# Patient Record
Sex: FEMALE | Race: WHITE | Employment: FULL TIME | URBAN - NONMETROPOLITAN AREA
[De-identification: names, ages, dates, MRNs, and addresses within clinical notes are randomized per-mention and may not be internally consistent; named-entity substitution may affect disease eponyms.]

---

## 2017-01-13 DIAGNOSIS — G43.009 MIGRAINE WITHOUT AURA AND WITHOUT STATUS MIGRAINOSUS, NOT INTRACTABLE: ICD-10-CM

## 2017-01-13 RX ORDER — LAMOTRIGINE 100 MG/1
100 TABLET ORAL 2 TIMES DAILY
Qty: 60 TABLET | Refills: 5 | Status: SHIPPED | OUTPATIENT
Start: 2017-01-13 | End: 2017-02-20 | Stop reason: SDUPTHER

## 2017-02-20 ENCOUNTER — OFFICE VISIT (OUTPATIENT)
Dept: PHYSICAL MEDICINE AND REHAB | Age: 42
End: 2017-02-20

## 2017-02-20 VITALS
DIASTOLIC BLOOD PRESSURE: 80 MMHG | SYSTOLIC BLOOD PRESSURE: 111 MMHG | HEART RATE: 74 BPM | HEIGHT: 60 IN | BODY MASS INDEX: 24.26 KG/M2 | WEIGHT: 123.6 LBS

## 2017-02-20 DIAGNOSIS — G43.009 MIGRAINE WITHOUT AURA AND WITHOUT STATUS MIGRAINOSUS, NOT INTRACTABLE: Primary | ICD-10-CM

## 2017-02-20 PROCEDURE — 99213 OFFICE O/P EST LOW 20 MIN: CPT | Performed by: PSYCHIATRY & NEUROLOGY

## 2017-02-20 RX ORDER — LAMOTRIGINE 100 MG/1
100 TABLET ORAL 2 TIMES DAILY
Qty: 60 TABLET | Refills: 5 | Status: SHIPPED | OUTPATIENT
Start: 2017-02-20 | End: 2017-07-12 | Stop reason: SDUPTHER

## 2017-03-20 ENCOUNTER — TELEPHONE (OUTPATIENT)
Dept: PHYSICAL MEDICINE AND REHAB | Age: 42
End: 2017-03-20

## 2017-03-20 RX ORDER — ELETRIPTAN HYDROBROMIDE 40 MG/1
40 TABLET, FILM COATED ORAL DAILY PRN
Qty: 9 TABLET | Refills: 3 | Status: SHIPPED | OUTPATIENT
Start: 2017-03-20 | End: 2020-01-06 | Stop reason: SDUPTHER

## 2017-07-12 DIAGNOSIS — G43.009 MIGRAINE WITHOUT AURA AND WITHOUT STATUS MIGRAINOSUS, NOT INTRACTABLE: ICD-10-CM

## 2017-07-12 RX ORDER — LAMOTRIGINE 100 MG/1
100 TABLET ORAL 2 TIMES DAILY
Qty: 60 TABLET | Refills: 5 | Status: SHIPPED | OUTPATIENT
Start: 2017-07-12 | End: 2017-10-02 | Stop reason: SDUPTHER

## 2017-10-02 ENCOUNTER — OFFICE VISIT (OUTPATIENT)
Dept: NEUROLOGY | Age: 42
End: 2017-10-02
Payer: COMMERCIAL

## 2017-10-02 VITALS
SYSTOLIC BLOOD PRESSURE: 109 MMHG | WEIGHT: 132 LBS | HEART RATE: 62 BPM | DIASTOLIC BLOOD PRESSURE: 77 MMHG | HEIGHT: 60 IN | BODY MASS INDEX: 25.91 KG/M2

## 2017-10-02 DIAGNOSIS — G43.009 MIGRAINE WITHOUT AURA AND WITHOUT STATUS MIGRAINOSUS, NOT INTRACTABLE: ICD-10-CM

## 2017-10-02 DIAGNOSIS — G43.109 MIGRAINE WITH AURA AND WITHOUT STATUS MIGRAINOSUS, NOT INTRACTABLE: Primary | ICD-10-CM

## 2017-10-02 PROCEDURE — 99213 OFFICE O/P EST LOW 20 MIN: CPT | Performed by: PSYCHIATRY & NEUROLOGY

## 2017-10-02 RX ORDER — LAMOTRIGINE 150 MG/1
150 TABLET ORAL 2 TIMES DAILY
Qty: 60 TABLET | Refills: 5 | Status: SHIPPED | OUTPATIENT
Start: 2017-10-02 | End: 2018-03-05 | Stop reason: SDUPTHER

## 2017-10-02 NOTE — PATIENT INSTRUCTIONS
1. Change Lamictal to 150 mg twice a day. Refills given. 2. CBC and hepatic panel. 3. Provide us with feed back on progress in one month. 4. Follow up in 6 months.

## 2017-10-02 NOTE — PROGRESS NOTES
NEUROLOGY OUT PATIENT FOLLOW UP NOTE:  10/2/58026:53 PM    Kem Gutierrez is here for follow up headache. Her headaches are not well controlled. She can have headaches two times a week, migraines are 1-2 per month. She feels the Lamictal 100 mg twice a day helps. Her stress level is improved. She feels the lamictal helps with the stress level. Her stress level is better after changing jobs. She denies new symptoms. ROS:  Respiratory : no cough, no hemoptysis. Cardiac: no chest pain. No palpitations. Renal : no flank pain, no hematuria    Skin: no rash  Reviewed labs since last evaluation and discussed with patient. Allergies   Allergen Reactions    Sulfa Antibiotics Rash       Current Outpatient Prescriptions:     lamoTRIgine (LAMICTAL) 100 MG tablet, Take 1 tablet by mouth 2 times daily, Disp: 60 tablet, Rfl: 5    MULTIPLE VITAMINS PO, Take by mouth daily, Disp: , Rfl:     vitamin B-12 (CYANOCOBALAMIN) 1000 MCG tablet, Take 1,000 mcg by mouth daily, Disp: , Rfl:     NONFORMULARY, Immune Charge Vitamin(gummie), Disp: , Rfl:     Dapsone (ACZONE) 5 % GEL, Apply topically 2 times daily, Disp: , Rfl:     doxycycline (MORGIDOX) 100 MG capsule, Take 100 mg by mouth as needed, Disp: , Rfl:     butalbital-acetaminophen-caffeine (FIORICET, ESGIC) -40 MG per tablet, Take 1 tablet by mouth as needed for Pain, Disp: , Rfl:     Naproxen Sodium (ALEVE PO), Take by mouth as needed, Disp: , Rfl:     azelastine HCl 0.15 % SOLN, as needed , Disp: , Rfl:     eletriptan (RELPAX) 40 MG tablet, Take 1 tablet by mouth daily as needed (migraine) may repeat in 2 hours if necessary, Disp: 9 tablet, Rfl: 3      PE:   Vitals:    10/02/17 1538   BP: 109/77   Site: Left Arm   Position: Sitting   Pulse: 62   Weight: 132 lb (59.9 kg)   Height: 4' 11.84\" (1.52 m)     General Appearance:  alert and cooperative  Skin:  Skin color, texture, turgor normal. No rashes or lesions. Gen: AO3, NAD, Language is Intact.   Head: PERRL, EOMI, no icterus  Neck: There is no carotid bruits. The Neck is supple. Neuro: CN 2-12 grossly intact with no focal deficits. Power 5/5 Throughout symmetric, Reflexes are  symmetric. Long tracts are intact. Cerebellar exam is Intact. Sensory exam is intact to light touch. Gait is intact. Musculoskeletal:  Has no hand arthritis, no limitation of ROM in any of the four extremities. Lower extremities no edema        DATA:   EEG 11/24/2015:  IMPRESSION: This is a normal EEG. There was no evidence of epileptiform  activity appreciated throughout this recording. B 08=880  Folate=18      MRI brain 2/2017  I reviewed the MRI brain and agree with interpretation. Impression       1. Normal brain volume. 2. Minimal amount of abnormal signal which is faint in the subcortical white matter of the left frontal lobe. This can be related to patient history of chronic headaches. This could also represent early chronic small vessel ischemic changes. Assessment:    1. Migraine with aura and without status migrainosus, not intractable          Her headaches are not well controlled. She can have headaches two times a week, migraines are 1-2 per month. She feels the Lamictal 100 mg twice a day helps. Her stress level is improved. She is pleased with how she is doing. She denies new symptoms. Her exam is unchanged. After detailed discussion with patient we agreed on the following plan. Plan:  1. Change Lamictal to 150 mg twice a day. Refills given. 2. CBC and hepatic panel. 3. Provide us with feed back on progress in one month. 4. Follow up in 6 months. Please call if any questions.      Kyle Finney MD

## 2017-10-02 NOTE — MR AVS SNAPSHOT
your BMI, the greater your risk of heart disease, high blood pressure, type 2 diabetes, stroke, gallstones, arthritis, sleep apnea, and certain cancers. BMI is not perfect. It may overestimate body fat in athletes and people who are more muscular. If your body fat is high you can improve your BMI by decreasing your calorie intake and becoming more physically active. Learn more at: [email or web address]          Instructions    1. Change Lamictal to 150 mg twice a day. Refills given. 2. CBC and hepatic panel. 3. Provide us with feed back on progress in one month. 4. Follow up in 6 months. Today's Medication Changes          These changes are accurate as of: 10/2/17  3:59 PM.  If you have any questions, ask your nurse or doctor. CHANGE how you take these medications           lamoTRIgine 150 MG tablet   Commonly known as:  LAMICTAL   Instructions:   Take 1 tablet by mouth 2 times daily   Quantity:  60 tablet   Refills:  5   What changed:    - medication strength  - how much to take   Changed by:  Carol Reno MD            Where to Get Your Medications      These medications were sent to [address] [phone]  [address] [place] [id number]     Phone:  [phone]     lamoTRIgine 150 MG tablet               Your Current Medications Are              lamoTRIgine (LAMICTAL) 150 MG tablet Take 1 tablet by mouth 2 times daily    MULTIPLE VITAMINS PO Take by mouth daily    vitamin B-12 (CYANOCOBALAMIN) 1000 MCG tablet Take 1,000 mcg by mouth daily    NONFORMULARY Immune Charge Vitamin(gummie)    Dapsone (ACZONE) 5 % GEL Apply topically 2 times daily    doxycycline (MORGIDOX) 100 MG capsule Take 100 mg by mouth as needed    butalbital-acetaminophen-caffeine (FIORICET, ESGIC) -40 MG per tablet Take 1 tablet by mouth as needed for Pain Naproxen Sodium (ALEVE PO) Take by mouth as needed    azelastine HCl 0.15 % SOLN as needed     eletriptan (RELPAX) 40 MG tablet Take 1 tablet by mouth daily as needed (migraine) may repeat in 2 hours if necessary      Allergies              Sulfa Antibiotics Rash         Additional Information        Basic Information     Date Of Birth Sex Race Ethnicity Preferred Language    1975 Female White Non-/Non  English      Problem List as of 10/2/2017  Date Reviewed: 7/18/2016                Diverticulosis of large intestine without hemorrhage    Lower abdominal pain    GERD (gastroesophageal reflux disease)    Diarrhea      Preventive Care        Date Due    HIV screening is recommended for all people regardless of risk factors  aged 15-65 years at least once (lifetime) who have never been HIV tested. 10/31/1988    Tetanus Combination Vaccine (1 - Tdap) 10/31/1992    Pap Smear 10/31/1994    Cholesterol Screening 10/31/2013    Diabetes Screening 10/31/2013    Yearly Flu Vaccine (1) 9/1/2017            Certain Communicationst Signup           Our records indicate that you have an active [employer] account. You can view your After Visit Summary by going to [email or web address] ([email or web address]) and logging in with your [employer] username and password. If you don't have a [employer] username and password but a parent or guardian has access to your record, the parent or guardian should login with their own [employer] username and password and access your record to view the After Visit Summary. Additional Information  If you have questions, please contact the physician practice where you receive care. Remember, [employer] is NOT to be used for urgent needs. For medical emergencies, dial 911. For questions regarding your [employer] account call [phone]. If you have a clinical question, please call your doctor's office.

## 2018-03-05 ENCOUNTER — OFFICE VISIT (OUTPATIENT)
Dept: NEUROLOGY | Age: 43
End: 2018-03-05
Payer: COMMERCIAL

## 2018-03-05 VITALS
HEIGHT: 60 IN | HEART RATE: 62 BPM | BODY MASS INDEX: 25.13 KG/M2 | SYSTOLIC BLOOD PRESSURE: 112 MMHG | DIASTOLIC BLOOD PRESSURE: 80 MMHG | WEIGHT: 128 LBS

## 2018-03-05 DIAGNOSIS — G43.009 MIGRAINE WITHOUT AURA AND WITHOUT STATUS MIGRAINOSUS, NOT INTRACTABLE: ICD-10-CM

## 2018-03-05 DIAGNOSIS — G43.109 MIGRAINE WITH AURA AND WITHOUT STATUS MIGRAINOSUS, NOT INTRACTABLE: Primary | ICD-10-CM

## 2018-03-05 PROCEDURE — 99213 OFFICE O/P EST LOW 20 MIN: CPT | Performed by: PSYCHIATRY & NEUROLOGY

## 2018-03-05 RX ORDER — CHOLECALCIFEROL (VITAMIN D3) 25 MCG
CAPSULE ORAL PRN
COMMUNITY

## 2018-03-05 RX ORDER — VENLAFAXINE HYDROCHLORIDE 37.5 MG/1
37.5 CAPSULE, EXTENDED RELEASE ORAL DAILY
Qty: 30 CAPSULE | Refills: 3 | Status: SHIPPED | OUTPATIENT
Start: 2018-03-05 | End: 2018-03-26 | Stop reason: SINTOL

## 2018-03-05 RX ORDER — LAMOTRIGINE 150 MG/1
150 TABLET ORAL 2 TIMES DAILY
Qty: 60 TABLET | Refills: 5 | Status: SHIPPED | OUTPATIENT
Start: 2018-03-05 | End: 2018-08-16 | Stop reason: SDUPTHER

## 2018-03-26 ENCOUNTER — TELEPHONE (OUTPATIENT)
Dept: NEUROLOGY | Age: 43
End: 2018-03-26

## 2018-08-16 DIAGNOSIS — G43.009 MIGRAINE WITHOUT AURA AND WITHOUT STATUS MIGRAINOSUS, NOT INTRACTABLE: ICD-10-CM

## 2018-08-16 RX ORDER — LAMOTRIGINE 150 MG/1
150 TABLET ORAL 2 TIMES DAILY
Qty: 60 TABLET | Refills: 5 | Status: SHIPPED | OUTPATIENT
Start: 2018-08-16 | End: 2018-09-10 | Stop reason: SDUPTHER

## 2018-09-10 ENCOUNTER — OFFICE VISIT (OUTPATIENT)
Dept: NEUROLOGY | Age: 43
End: 2018-09-10
Payer: COMMERCIAL

## 2018-09-10 VITALS
HEIGHT: 60 IN | WEIGHT: 139 LBS | BODY MASS INDEX: 27.29 KG/M2 | DIASTOLIC BLOOD PRESSURE: 68 MMHG | SYSTOLIC BLOOD PRESSURE: 110 MMHG | HEART RATE: 78 BPM

## 2018-09-10 DIAGNOSIS — G43.109 MIGRAINE WITH AURA AND WITHOUT STATUS MIGRAINOSUS, NOT INTRACTABLE: Primary | ICD-10-CM

## 2018-09-10 DIAGNOSIS — G43.009 MIGRAINE WITHOUT AURA AND WITHOUT STATUS MIGRAINOSUS, NOT INTRACTABLE: ICD-10-CM

## 2018-09-10 PROCEDURE — 99213 OFFICE O/P EST LOW 20 MIN: CPT | Performed by: PSYCHIATRY & NEUROLOGY

## 2018-09-10 RX ORDER — LAMOTRIGINE 150 MG/1
150 TABLET ORAL 2 TIMES DAILY
Qty: 60 TABLET | Refills: 5 | Status: SHIPPED | OUTPATIENT
Start: 2018-09-10 | End: 2019-01-31 | Stop reason: SDUPTHER

## 2018-09-10 NOTE — PATIENT INSTRUCTIONS
1. Continue with Lamictal 150 mg twice a day. Refills given. 2. CBC and hepatic panel. 3. Provide us with feed back on progress in one month. 4. Follow up in 8 months.

## 2018-09-10 NOTE — PROGRESS NOTES
intact. Musculoskeletal:  Has no hand arthritis, no limitation of ROM in any of the four extremities. Lower extremities no edema        DATA:   EEG 11/24/2015:  IMPRESSION: This is a normal EEG. There was no evidence of epileptiform  activity appreciated throughout this recording. B 72=370  Folate=18      MRI brain 2/2017  I reviewed the MRI brain and agree with interpretation. Impression       1. Normal brain volume. 2. Minimal amount of abnormal signal which is faint in the subcortical white matter of the left frontal lobe. This can be related to patient history of chronic headaches. This could also represent early chronic small vessel ischemic changes. Assessment:     Diagnosis Orders   1. Migraine with aura and without status migrainosus, not intractable          Her headaches are better. Her stress level is good. Her sleep is good. She reports headaches one per day. She feels the Lamictal 150 mg twice a day helps. She feels there is room for improvement. Her exam is normal. After detailed discussion with patient we agreed on the following plan. Plan:  1. Continue with Lamictal 150 mg twice a day. Refills given. 2. CBC and hepatic panel. 3. Provide us with feed back on progress in one month. 4. Follow up in 8 months. Please call if any questions.      Liz Chaudhari MD

## 2019-01-31 DIAGNOSIS — G43.009 MIGRAINE WITHOUT AURA AND WITHOUT STATUS MIGRAINOSUS, NOT INTRACTABLE: ICD-10-CM

## 2019-01-31 RX ORDER — LAMOTRIGINE 150 MG/1
150 TABLET ORAL 2 TIMES DAILY
Qty: 60 TABLET | Refills: 5 | Status: SHIPPED | OUTPATIENT
Start: 2019-01-31 | End: 2019-08-15 | Stop reason: SDUPTHER

## 2019-05-09 PROBLEM — H91.90 HEARING LOSS: Status: ACTIVE | Noted: 2019-05-09

## 2019-05-09 PROBLEM — H69.80 DYSFUNCTION OF EUSTACHIAN TUBE: Status: ACTIVE | Noted: 2019-05-09

## 2019-05-09 PROBLEM — R10.13 EPIGASTRIC PAIN: Status: ACTIVE | Noted: 2019-05-09

## 2019-05-09 PROBLEM — H69.90 DYSFUNCTION OF EUSTACHIAN TUBE: Status: ACTIVE | Noted: 2019-05-09

## 2019-05-09 PROBLEM — R53.83 FATIGUE: Status: ACTIVE | Noted: 2019-05-09

## 2019-05-09 PROBLEM — R00.2 PALPITATIONS: Status: ACTIVE | Noted: 2019-05-09

## 2019-05-09 PROBLEM — R51.9 HEADACHE: Status: ACTIVE | Noted: 2019-05-09

## 2019-05-09 PROBLEM — N95.1 MENOPAUSAL SYNDROME: Status: ACTIVE | Noted: 2019-05-09

## 2019-05-09 PROBLEM — H92.09 REFERRED OTALGIA: Status: ACTIVE | Noted: 2019-05-09

## 2019-05-09 PROBLEM — J02.9 ACUTE PHARYNGITIS: Status: ACTIVE | Noted: 2019-05-09

## 2019-05-09 PROBLEM — L65.9 ALOPECIA: Status: ACTIVE | Noted: 2019-05-09

## 2019-05-09 PROBLEM — R53.83 MALAISE AND FATIGUE: Status: ACTIVE | Noted: 2019-05-09

## 2019-05-09 PROBLEM — R53.81 MALAISE AND FATIGUE: Status: ACTIVE | Noted: 2019-05-09

## 2019-05-09 PROBLEM — K58.9 IRRITABLE BOWEL SYNDROME: Status: ACTIVE | Noted: 2019-05-09

## 2019-05-09 PROBLEM — G43.109 MIGRAINE WITH AURA: Status: ACTIVE | Noted: 2019-05-09

## 2019-05-13 ENCOUNTER — OFFICE VISIT (OUTPATIENT)
Dept: NEUROLOGY | Age: 44
End: 2019-05-13
Payer: COMMERCIAL

## 2019-05-13 VITALS
BODY MASS INDEX: 27.29 KG/M2 | HEART RATE: 68 BPM | WEIGHT: 139 LBS | HEIGHT: 60 IN | SYSTOLIC BLOOD PRESSURE: 124 MMHG | DIASTOLIC BLOOD PRESSURE: 66 MMHG

## 2019-05-13 DIAGNOSIS — G43.109 MIGRAINE WITH AURA AND WITHOUT STATUS MIGRAINOSUS, NOT INTRACTABLE: Primary | ICD-10-CM

## 2019-05-13 PROCEDURE — 99213 OFFICE O/P EST LOW 20 MIN: CPT | Performed by: PSYCHIATRY & NEUROLOGY

## 2019-05-13 RX ORDER — DOXYCYCLINE HYCLATE 75 MG/1
75 TABLET, DELAYED RELEASE ORAL 2 TIMES DAILY PRN
COMMUNITY

## 2019-05-13 NOTE — LETTER
[address]  Phone: [phone]  Fax: [phone]    Gregg Noland MD        May 13, 2019     Patient: Felicitas Diaz   YOB: 1975   Date of Visit: 5/13/2019       To Whom it May Concern:    Marina Veronica was seen in my clinic on 5/13/2019. If you have any questions or concerns, please don't hesitate to call.     Sincerely,         Gregg Noland MD

## 2019-05-27 NOTE — PROGRESS NOTES
NEUROLOGY OUT PATIENT FOLLOW UP NOTE: 5/13/20193:05 PM  ?  Carmen Limon is here for follow up for headache. She feels her headaches are well controlled. She can still have occasional Headache that is relieved with over the counter medications. She is here to go over testing results and the plan of care going forward. ?  ?  ROS:  Respiratory : no cough, no shortness of breath  Cardiac: no chest pain. No palpitations. Renal : no flank pain, no hematuria   Skin: no rash  ? Allergies   Allergen Reactions    Sulfa Antibiotics Rash   ? Current Outpatient Medications:    Doxycycline Hyclate 75 MG TBEC, Take by mouth, Disp: , Rfl:    lamoTRIgine (LAMICTAL) 150 MG tablet, Take 1 tablet by mouth 2 times daily, Disp: 60 tablet, Rfl: 5   Cholecalciferol (VITAMIN D-3) 1000 units CAPS, Take by mouth, Disp: , Rfl:    NONFORMULARY, , Disp: , Rfl:    eletriptan (RELPAX) 40 MG tablet, Take 1 tablet by mouth daily as needed (migraine) may repeat in 2 hours if necessary, Disp: 9 tablet, Rfl: 3   MULTIPLE VITAMINS PO, Take by mouth daily, Disp: , Rfl:    Dapsone (ACZONE) 5 % GEL, Apply topically 2 times daily, Disp: , Rfl:    Naproxen Sodium (ALEVE PO), Take by mouth as needed, Disp: , Rfl:    azelastine HCl 0.15 % SOLN, as needed , Disp: , Rfl:   ?  PE:   Vitals:   ? 05/13/19 1450   BP: 124/66   Site: Left Upper Arm   Position: Sitting   Cuff Size: Medium Adult   Pulse: 68   Weight: 139 lb (63 kg)   Height: 5' (1.524 m)   General Appearance: awake, alert, oriented, in no acute distress  Gen: NAD, Language is Intact. Head: no rash, no icterus  Neck: There is no carotid bruits. The Neck is supple. Neuro: CN 2-12 grossly intact with no focal deficits. Power 5/5 Throughout symmetric, Reflexes are decreased symmetric. Long tracts are intact. Cerebellar exam is Intact. Sensory exam is intact to light touch. Gait is intact.   Musculoskeletal: Has no hand arthritis, no limitation of ROM in any of the four is here for follow up for headache. She feels her headaches are well controlled. She can still have occasional Headache that is relieved with over the counter medications. Her exam is non focal. Upon reviewing her blood work her LFTs were mildly elevated. We will arrange for her to repeat LFT. After detailed discussion with patient we agreed on the following plan. ?  ?  Plan:  1. Continue with Lamictal 150 mg twice a day. Refills given. 2. Repeat hepatic function panel. 3. Follow up in 8 months. ?  Please call if any questions.      Candice Wheat MD

## 2019-08-15 DIAGNOSIS — G43.009 MIGRAINE WITHOUT AURA AND WITHOUT STATUS MIGRAINOSUS, NOT INTRACTABLE: Primary | ICD-10-CM

## 2019-08-15 RX ORDER — LAMOTRIGINE 150 MG/1
150 TABLET ORAL 2 TIMES DAILY
Qty: 60 TABLET | Refills: 5 | Status: SHIPPED | OUTPATIENT
Start: 2019-08-15 | End: 2020-02-24 | Stop reason: SDUPTHER

## 2020-01-06 ENCOUNTER — OFFICE VISIT (OUTPATIENT)
Dept: NEUROLOGY | Age: 45
End: 2020-01-06
Payer: COMMERCIAL

## 2020-01-06 VITALS
HEIGHT: 60 IN | DIASTOLIC BLOOD PRESSURE: 62 MMHG | BODY MASS INDEX: 25.4 KG/M2 | WEIGHT: 129.4 LBS | SYSTOLIC BLOOD PRESSURE: 106 MMHG | HEART RATE: 80 BPM

## 2020-01-06 PROCEDURE — 99213 OFFICE O/P EST LOW 20 MIN: CPT | Performed by: PSYCHIATRY & NEUROLOGY

## 2020-01-06 RX ORDER — ELETRIPTAN HYDROBROMIDE 40 MG/1
40 TABLET, FILM COATED ORAL DAILY PRN
Qty: 9 TABLET | Refills: 3 | Status: SHIPPED | OUTPATIENT
Start: 2020-01-06 | End: 2020-04-22 | Stop reason: SDUPTHER

## 2020-01-06 RX ORDER — ACETAMINOPHEN 325 MG/1
650 TABLET ORAL EVERY 6 HOURS PRN
COMMUNITY

## 2020-01-06 NOTE — PROGRESS NOTES
NEUROLOGY OUT PATIENT FOLLOW UP NOTE: 5/13/20193:05 PM  ?  Sly Beard is here for follow up for headaches. The patient reports her headaches are worse in the last month, she has increased job stressors recently. The pattern of the headache location and intensity are the same. Her liver functions have normalized in May 2019. She does not use her rescue medications for breakthrough headaches. She is here to discuss plan of care going forward. ?  ?  ROS:  Respiratory : no cough, no shortness of breath  Cardiac: no chest pain. No palpitations. Renal : no flank pain, no hematuria   Skin: no rash  ? Allergies   Allergen Reactions    Sulfa Antibiotics Rash   ? Current Outpatient Medications on File Prior to Visit   Medication Sig Dispense Refill    acetaminophen (TYLENOL) 325 MG tablet Take 650 mg by mouth every 6 hours as needed for Pain      lamoTRIgine (LAMICTAL) 150 MG tablet Take 1 tablet by mouth 2 times daily 60 tablet 5    Doxycycline Hyclate 75 MG TBEC Take 75 mg by mouth 2 times daily       NONFORMULARY       Cholecalciferol (VITAMIN D-3) 1000 units CAPS Take by mouth      MULTIPLE VITAMINS PO Take by mouth daily      Dapsone (ACZONE) 5 % GEL Apply topically 2 times daily      Naproxen Sodium (ALEVE PO) Take by mouth as needed      azelastine HCl 0.15 % SOLN as needed        No current facility-administered medications on file prior to visit. ?  PE:   Vitals:    01/06/20 1459   BP: 106/62   Site: Left Upper Arm   Position: Sitting   Cuff Size: Small Adult   Pulse: 80   Weight: 129 lb 6.4 oz (58.7 kg)   Height: 5' (1.524 m)      General Appearance: awake, alert, oriented, in no acute distress  Gen: NAD, Language is Intact. Head: no rash, no icterus  Neck: There is no carotid bruits. The Neck is supple. Neuro: CN 2-12 grossly intact with no focal deficits. Power 5/5 Throughout symmetric, Reflexes are decreased symmetric. Long tracts are intact. Cerebellar exam is Intact.  Sensory AM     ?  Assessment:  ? Diagnosis Orders   1. Migraine with aura and without status migrainosus, not intractable  ? ? She is here for follow up for headache. Her headache was worse in the last one month, with increased job stress. Her pattern of headache is the same. He exam is normal. Her LFTs have normalized in 5/2019. She did not use rescue medication for her headache. After detailed discussion with patient we agreed on the following plan. ?  ?  Plan:  1. Continue with Lamictal 150 mg twice a day. Refills given. 2. May use Relpax for breakthrough headache. 3. CBC and hepatic panel  4. Follow up in 4 months. ?  Please call if any questions.      Roro Byers MD

## 2020-02-24 RX ORDER — LAMOTRIGINE 150 MG/1
150 TABLET ORAL 2 TIMES DAILY
Qty: 60 TABLET | Refills: 5 | Status: SHIPPED | OUTPATIENT
Start: 2020-02-24 | End: 2020-04-22 | Stop reason: SDUPTHER

## 2020-04-22 ENCOUNTER — VIRTUAL VISIT (OUTPATIENT)
Dept: NEUROLOGY | Age: 45
End: 2020-04-22
Payer: COMMERCIAL

## 2020-04-22 PROCEDURE — 99213 OFFICE O/P EST LOW 20 MIN: CPT | Performed by: PSYCHIATRY & NEUROLOGY

## 2020-04-22 RX ORDER — LAMOTRIGINE 150 MG/1
150 TABLET ORAL 2 TIMES DAILY
Qty: 60 TABLET | Refills: 5 | Status: SHIPPED | OUTPATIENT
Start: 2020-04-22 | End: 2020-10-17 | Stop reason: SDUPTHER

## 2020-04-22 RX ORDER — ELETRIPTAN HYDROBROMIDE 40 MG/1
40 TABLET, FILM COATED ORAL DAILY PRN
Qty: 9 TABLET | Refills: 3 | Status: SHIPPED | OUTPATIENT
Start: 2020-04-22 | End: 2021-12-13 | Stop reason: SDUPTHER

## 2020-04-22 NOTE — PROGRESS NOTES
2020    TELEHEALTH EVALUATION -- Audio/Visual (During [facility]-55 public health emergency)    HPI:    Shruti Garcia (:  1975) has requested an audio/video evaluation for the following concern(s):  Follow up for migraine headaches. She reports doing better with her since last evaluation. She is currently on Lamictal 150 mg twice a day tolerated well no side effects reported to the medication. She also uses Relpax for breakthrough headaches occasionally. Her stress level is better. She denies new symptoms, she is evaluated via video link to discuss plan of care going forward, and medication options. .     Review of Systems   Constitutional: Negative. Musculoskeletal: Negative. Skin: Negative. Hematological: Does not bruise/bleed easily. Prior to Visit Medications    Medication Sig Taking? Authorizing Provider   lamoTRIgine (LAMICTAL) 150 MG tablet Take 1 tablet by mouth 2 times daily  SONIA Watson - CNP   acetaminophen (TYLENOL) 325 MG tablet Take 650 mg by mouth every 6 hours as needed for Pain  Historical Provider, MD   eletriptan (RELPAX) 40 MG tablet Take 1 tablet by mouth daily as needed (migraine) may repeat in 2 hours if necessary  Kathryn Okeefe MD   Doxycycline Hyclate 75 MG TBEC Take 75 mg by mouth 2 times daily   Historical Provider, MD   Cholecalciferol (VITAMIN D-3) 1000 units CAPS Take by mouth  Historical Provider, MD   NONFORMULARY   Historical Provider, MD   MULTIPLE VITAMINS PO Take by mouth daily  Historical Provider, MD   Dapsone (ACZONE) 5 % GEL Apply topically 2 times daily  Historical Provider, MD   Naproxen Sodium (ALEVE PO) Take by mouth as needed  Historical Provider, MD   azelastine HCl 0.15 % SOLN as needed   Historical Provider, MD       Social History     Tobacco Use    Smoking status: Never Smoker    Smokeless tobacco: Never Used   Substance Use Topics    Alcohol use:  Yes     Alcohol/week: 1.0 standard drinks     Types: 1 Cans of beer per Pursuant to the emergency declaration under the [facility], [address] [place] [address] authority and the [employer] and [employer] Act, this Virtual Visit was conducted with patient's (and/or legal guardian's) consent, to reduce the patient's risk of exposure to COVID-19 and provide necessary medical care. The patient (and/or legal guardian) has also been advised to contact this office for worsening conditions or problems, and seek emergency medical treatment and/or call 911 if deemed necessary. Services were provided through a video synchronous discussion virtually to substitute for in-person clinic visit. Patient and provider were located at their individual homes. --Thalia Hernandez MD on 4/22/2020 at 1:56 PM    An electronic signature was used to authenticate this note.

## 2020-04-22 NOTE — PATIENT INSTRUCTIONS
1. Continue with Lamictal 150 mg twice a day. Refills given. 2. May use Relpax for breakthrough headache. 3. CBC and hepatic panel  4. Follow up in 4 months.

## 2020-07-30 ENCOUNTER — TELEPHONE (OUTPATIENT)
Dept: NEUROLOGY | Age: 45
End: 2020-07-30

## 2020-07-30 NOTE — TELEPHONE ENCOUNTER
Sedrate, MRI Brain without and with contrast (blurred vision and headache) please. Follow up next week.    Isaac Sullivan MD

## 2020-08-10 ENCOUNTER — TELEPHONE (OUTPATIENT)
Dept: NEUROLOGY | Age: 45
End: 2020-08-10

## 2020-08-10 ENCOUNTER — VIRTUAL VISIT (OUTPATIENT)
Dept: NEUROLOGY | Age: 45
End: 2020-08-10
Payer: COMMERCIAL

## 2020-08-10 PROCEDURE — 99213 OFFICE O/P EST LOW 20 MIN: CPT | Performed by: PSYCHIATRY & NEUROLOGY

## 2020-08-10 RX ORDER — ERENUMAB-AOOE 70 MG/ML
70 INJECTION SUBCUTANEOUS
Qty: 1 PEN | Refills: 3 | Status: SHIPPED | OUTPATIENT
Start: 2020-08-10 | End: 2020-12-14

## 2020-08-10 NOTE — PATIENT INSTRUCTIONS
1. Start Aimovig 70 mg monthly. 2. Continue with Lamictal 150 mg twice a day. Refills given. 3. May use Relpax for breakthrough headache. 4. Follow up in 3-4 months.

## 2020-08-10 NOTE — TELEPHONE ENCOUNTER
----- Message from Alessia Duke MD sent at 8/9/2020  9:44 AM EDT -----  Please let patient Know MRI brain result was normal.

## 2020-08-10 NOTE — PROGRESS NOTES
as needed  Historical Provider, MD   azelastine HCl 0.15 % SOLN as needed   Historical Provider, MD       Social History     Tobacco Use    Smoking status: Never Smoker    Smokeless tobacco: Never Used   Substance Use Topics    Alcohol use: Yes     Alcohol/week: 1.0 standard drinks     Types: 1 Cans of beer per week     Comment: not very often    Drug use: No        Past Medical History:   Diagnosis Date    Allergic rhinitis     Bladder infection     Blood in urine     Bronchitis     Frequent headaches     Frequent sinus infections     GERD (gastroesophageal reflux disease)     Headache     Irritable bowel syndrome     Migraines     Painful swelling of joint     Rosacea     Tattoos    ,   Past Surgical History:   Procedure Laterality Date    COLONOSCOPY  2016    ELBOW SURGERY Left 06/28/2016    FINGER TRIGGER RELEASE      HYSTERECTOMY      OVARY REMOVAL Right     SHOULDER SURGERY      SINUS SURGERY  4/2015    UPPER GASTROINTESTINAL ENDOSCOPY  2016   ,   Social History     Tobacco Use    Smoking status: Never Smoker    Smokeless tobacco: Never Used   Substance Use Topics    Alcohol use:  Yes     Alcohol/week: 1.0 standard drinks     Types: 1 Cans of beer per week     Comment: not very often    Drug use: No   ,   Family History   Problem Relation Age of Onset    Arthritis Mother     Other Mother         Brain Aneurysm    Cancer Mother     High Blood Pressure Mother     COPD Father     Cancer Maternal Grandmother     Cancer Maternal Grandfather     Cancer Paternal Grandmother     Cancer Paternal Grandfather        PHYSICAL EXAMINATION:  [ INSTRUCTIONS:  \"[x]\" Indicates a positive item  \"[]\" Indicates a negative item  -- DELETE ALL ITEMS NOT EXAMINED]  Vital Signs: (As obtained by patient/caregiver or practitioner observation)    Blood pressure-  Heart rate-    Respiratory rate-    Temperature-  Pulse oximetry-     Constitutional: [x] Appears well-developed and well-nourished [x] No apparent distress      [] Abnormal-   Mental status  [x] Alert and awake  [x] Oriented to person/place/time [x]Able to follow commands      Eyes:  EOM    [x]  Normal  [] Abnormal-  Sclera  [x]  Normal  [] Abnormal -         Discharge [x]  None visible  [] Abnormal -    HENT:   [x] Normocephalic, atraumatic. [] Abnormal   [x] Mouth/Throat: Mucous membranes are moist.     External Ears [x] Normal  [] Abnormal-     Neck: [x] No visualized mass     Pulmonary/Chest: [x] Respiratory effort normal.  [x] No visualized signs of difficulty breathing or respiratory distress        [] Abnormal-      Musculoskeletal:   [x] Normal gait with no signs of ataxia         [x] Normal range of motion of neck        [] Abnormal-       Neurological:        [x] No Facial Asymmetry (Cranial nerve 7 motor function) (limited exam to video visit)          [x] No gaze palsy        [] Abnormal-         Skin:        [x] No significant exanthematous lesions or discoloration noted on facial skin         [] Abnormal-            Psychiatric:       [x] Normal Affect [x] No Hallucinations        [] Abnormal-     Other pertinent observable physical exam findings-     ASSESSMENT/PLAN:  1. Migraine with aura and without status migrainosus, not intractable  She reports her migraines are not well controlled on the current combination of medications. She has tried multiple medicines as outlined above. She would benefit from Aimovig 70 mg subcutaneous monthly. As she has not responded well to other multiple medications as stated. She does not like the after math fatigue and sleepiness of Relpax. After detailed discussion with patient we agreed on the following plan. 1. Start Aimovig 70 mg monthly. 2. Continue with Lamictal 150 mg twice a day. Refills given. 3. May use Relpax for breakthrough headache. 4. Follow up in 3-4 months. Return in about 3 months (around 11/10/2020).   Time spent evaluating patient, reviewing records, counseling,was more than 15 min. All patient's questions were answered. Please call if any questions. Anthony Thakur is a 53 y.o. female being evaluated by a Virtual Visit (video visit) encounter to address concerns as mentioned above. A caregiver was present when appropriate. Due to this being a TeleHealth encounter (During [facility]-24 public health emergency), evaluation of the following organ systems was limited: Vitals/Constitutional/EENT/Resp/CV/GI//MS/Neuro/Skin/Heme-Lymph-Imm. Pursuant to the emergency declaration under the [address] [place] [address], [address] [place] [address] authority and the [employer] and [employer] Act, this Virtual Visit was conducted with patient's (and/or legal guardian's) consent, to reduce the patient's risk of exposure to COVID-19 and provide necessary medical care. The patient (and/or legal guardian) has also been advised to contact this office for worsening conditions or problems, and seek emergency medical treatment and/or call 911 if deemed necessary. Services were provided through a video synchronous discussion virtually to substitute for in-person clinic visit. Patient and provider were located at their individual homes. --Tyson Oates MD on 8/10/2020 at 10:21 AM    An electronic signature was used to authenticate this note.

## 2020-10-07 ENCOUNTER — TELEPHONE (OUTPATIENT)
Dept: NEUROLOGY | Age: 45
End: 2020-10-07

## 2020-10-07 NOTE — TELEPHONE ENCOUNTER
Patient sent [employer] message stating for the past 2 months she is having shaking in her arms that got worse with her first Aimovig injection. She is due for next Aimovig injection next week. She is wondering if she should proceed with the Aimovig. The Abby Caleb is helping with her headaches. Please advise. Thank you.

## 2020-10-19 RX ORDER — LAMOTRIGINE 150 MG/1
150 TABLET ORAL 2 TIMES DAILY
Qty: 60 TABLET | Refills: 5 | Status: SHIPPED | OUTPATIENT
Start: 2020-10-19 | End: 2020-12-14 | Stop reason: SDUPTHER

## 2020-12-14 ENCOUNTER — VIRTUAL VISIT (OUTPATIENT)
Dept: NEUROLOGY | Age: 45
End: 2020-12-14
Payer: COMMERCIAL

## 2020-12-14 PROCEDURE — 99213 OFFICE O/P EST LOW 20 MIN: CPT | Performed by: PSYCHIATRY & NEUROLOGY

## 2020-12-14 RX ORDER — LAMOTRIGINE 150 MG/1
150 TABLET ORAL 2 TIMES DAILY
Qty: 60 TABLET | Refills: 5 | Status: SHIPPED | OUTPATIENT
Start: 2020-12-14 | End: 2021-06-07 | Stop reason: SDUPTHER

## 2020-12-14 RX ORDER — ERENUMAB-AOOE 70 MG/ML
INJECTION SUBCUTANEOUS
Qty: 1 PEN | Refills: 3 | Status: SHIPPED | OUTPATIENT
Start: 2020-12-14 | End: 2021-04-14

## 2020-12-14 RX ORDER — ERENUMAB-AOOE 70 MG/ML
INJECTION SUBCUTANEOUS
Qty: 1 PEN | Refills: 3 | Status: SHIPPED | OUTPATIENT
Start: 2020-12-14 | End: 2020-12-14 | Stop reason: SDUPTHER

## 2020-12-14 NOTE — PATIENT INSTRUCTIONS
1. Continue with Aimovig 70 mg monthly. 2. Continue with Lamictal 150 mg twice a day. Refills given. 3. May use Relpax for breakthrough headache. 4. Follow up in 6 months.

## 2020-12-14 NOTE — PROGRESS NOTES
2020    TELEHEALTH EVALUATION -- Audio/Visual (During DAOTP-21 public health emergency)    HPI:    Louisa Hagan (:  1975) has requested an audio/video evaluation for the following concern(s):  Follow up for migraine headaches. The patient reports her headaches have improved compared to last evaluation, combination of Aimovig, and Lamictal are helping with the reduction of her headaches, occasionally she can have breakthrough headaches that she uses Relpax for. She denies any side effects to the medication. She has tried Effexor, Topamax, Depakote, Fioricet, and Frova in the past.She reports doing better with her since last evaluation. Due to the severity of her symptoms, the patient was referred for MRI brain, and sed rate that were normal.  She still reports headaches that are frequent and severe, and incapacitating. She is evaluated via video link to discuss plan of care going forward, and medication options. .     Review of Systems   Constitutional: Negative. Musculoskeletal: Negative. Skin: Negative. Hematological: Does not bruise/bleed easily. Prior to Visit Medications    Medication Sig Taking?  Authorizing Provider   AIMOVIG 70 MG/ML SOAJ INJECT 70 MILLIGRAMS (ONE INJECTOR) UNDER THE SKIN EVERY 30 DAYS  SONIA Ram CNP   lamoTRIgine (LAMICTAL) 150 MG tablet Take 1 tablet by mouth 2 times daily  SONIA Ram CNP   eletriptan (RELPAX) 40 MG tablet Take 1 tablet by mouth daily as needed (migraine) may repeat in 2 hours if necessary  Yoli Paredes MD   acetaminophen (TYLENOL) 325 MG tablet Take 650 mg by mouth every 6 hours as needed for Pain  Historical Provider, MD   Doxycycline Hyclate 75 MG TBEC Take 75 mg by mouth 2 times daily as needed   Historical Provider, MD   Cholecalciferol (VITAMIN D-3) 1000 units CAPS Take by mouth  Historical Provider, MD   NONFORMULARY   Historical Provider, MD   MULTIPLE VITAMINS PO Take by mouth daily  Historical Provider, MD   Dapsone (ACZONE) 5 % GEL Apply topically 2 times daily  Historical Provider, MD   Naproxen Sodium (ALEVE PO) Take by mouth as needed  Historical Provider, MD   azelastine HCl 0.15 % SOLN as needed   Historical Provider, MD       Social History     Tobacco Use    Smoking status: Never Smoker    Smokeless tobacco: Never Used   Substance Use Topics    Alcohol use: Yes     Alcohol/week: 1.0 standard drinks     Types: 1 Cans of beer per week     Comment: not very often    Drug use: No        Past Medical History:   Diagnosis Date    Allergic rhinitis     Bladder infection     Blood in urine     Bronchitis     Frequent headaches     Frequent sinus infections     GERD (gastroesophageal reflux disease)     Headache     Irritable bowel syndrome     Migraines     Painful swelling of joint     Rosacea     Tattoos    ,   Past Surgical History:   Procedure Laterality Date    COLONOSCOPY  2016    ELBOW SURGERY Left 06/28/2016    FINGER TRIGGER RELEASE      HYSTERECTOMY      OVARY REMOVAL Right     SHOULDER SURGERY      SINUS SURGERY  4/2015    UPPER GASTROINTESTINAL ENDOSCOPY  2016   ,   Social History     Tobacco Use    Smoking status: Never Smoker    Smokeless tobacco: Never Used   Substance Use Topics    Alcohol use:  Yes     Alcohol/week: 1.0 standard drinks     Types: 1 Cans of beer per week     Comment: not very often    Drug use: No   ,   Family History   Problem Relation Age of Onset    Arthritis Mother     Other Mother         Brain Aneurysm    Cancer Mother     High Blood Pressure Mother     COPD Father     Cancer Maternal Grandmother     Cancer Maternal Grandfather     Cancer Paternal Grandmother     Cancer Paternal Grandfather        PHYSICAL EXAMINATION:  [ INSTRUCTIONS:  \"[x]\" Indicates a positive item  \"[]\" Indicates a negative item  -- DELETE ALL ITEMS NOT EXAMINED]  Vital Signs: (As obtained by patient/caregiver or practitioner observation)    Blood pressure- Heart rate-    Respiratory rate-    Temperature-  Pulse oximetry-     Constitutional: [x] Appears well-developed and well-nourished [x] No apparent distress      [] Abnormal-   Mental status  [x] Alert and awake  [x] Oriented to person/place/time [x]Able to follow commands      Eyes:  EOM    [x]  Normal  [] Abnormal-  Sclera  [x]  Normal  [] Abnormal -         Discharge [x]  None visible  [] Abnormal -    HENT:   [x] Normocephalic, atraumatic. [] Abnormal   [x] Mouth/Throat: Mucous membranes are moist.     External Ears [x] Normal  [] Abnormal-     Neck: [x] No visualized mass     Pulmonary/Chest: [x] Respiratory effort normal.  [x] No visualized signs of difficulty breathing or respiratory distress        [] Abnormal-      Musculoskeletal:   [x] Normal gait with no signs of ataxia         [x] Normal range of motion of neck        [] Abnormal-       Neurological:        [x] No Facial Asymmetry (Cranial nerve 7 motor function) (limited exam to video visit)          [x] No gaze palsy        [] Abnormal-         Skin:        [x] No significant exanthematous lesions or discoloration noted on facial skin         [] Abnormal-            Psychiatric:       [x] Normal Affect [x] No Hallucinations        [] Abnormal-     Other pertinent observable physical exam findings-   Results for orders placed or performed during the hospital encounter of 03/18/17   Sedimentation Rate   Result Value Ref Range    Sed Rate 10 0 - 20 mm/hr      ASSESSMENT/PLAN:  1. Migraine with aura and without status migrainosus, not intractable  She reports her migraines are well controlled with current combination of medications. Her headaches are few and far apart. She uses Lamictal, Aimovig, for prevention, and Relpax for breakthrough. After detailed discussion with patient we agreed on the following plan. 1. Continue with Aimovig 70 mg monthly. 2. Continue with Lamictal 150 mg twice a day. Refills given.   3. May use Relpax for breakthrough

## 2021-01-13 NOTE — TELEPHONE ENCOUNTER
If she feels this shakiness is started after the Aimovig, then she needs to avoid it, as we cannot reverse its effect after taking the second injection for a whole month. If she wants to try it and see if she has it again then that is also an option realizing that it may cause the same effect if it is indeed the medication.    Maureen Benton MD Albendazole Counseling:  I discussed with the patient the risks of albendazole including but not limited to cytopenia, kidney damage, nausea/vomiting and severe allergy.  The patient understands that this medication is being used in an off-label manner.

## 2021-04-14 DIAGNOSIS — G43.109 MIGRAINE WITH AURA AND WITHOUT STATUS MIGRAINOSUS, NOT INTRACTABLE: Primary | ICD-10-CM

## 2021-04-14 RX ORDER — ERENUMAB-AOOE 70 MG/ML
INJECTION SUBCUTANEOUS
Qty: 1 PEN | Refills: 5 | Status: SHIPPED | OUTPATIENT
Start: 2021-04-14 | End: 2021-06-07 | Stop reason: SDUPTHER

## 2021-06-07 ENCOUNTER — OFFICE VISIT (OUTPATIENT)
Dept: NEUROLOGY | Age: 46
End: 2021-06-07
Payer: COMMERCIAL

## 2021-06-07 VITALS
HEIGHT: 60 IN | DIASTOLIC BLOOD PRESSURE: 64 MMHG | BODY MASS INDEX: 26.11 KG/M2 | SYSTOLIC BLOOD PRESSURE: 118 MMHG | HEART RATE: 75 BPM | OXYGEN SATURATION: 97 % | WEIGHT: 133 LBS

## 2021-06-07 DIAGNOSIS — G43.109 MIGRAINE WITH AURA AND WITHOUT STATUS MIGRAINOSUS, NOT INTRACTABLE: Primary | ICD-10-CM

## 2021-06-07 PROCEDURE — 99213 OFFICE O/P EST LOW 20 MIN: CPT | Performed by: PSYCHIATRY & NEUROLOGY

## 2021-06-07 RX ORDER — ERENUMAB-AOOE 70 MG/ML
INJECTION SUBCUTANEOUS
Qty: 1 PEN | Refills: 5 | Status: SHIPPED | OUTPATIENT
Start: 2021-06-07 | End: 2021-12-06

## 2021-06-07 RX ORDER — OMEPRAZOLE 20 MG/1
20 CAPSULE, DELAYED RELEASE ORAL PRN
COMMUNITY
Start: 2020-05-20

## 2021-06-07 RX ORDER — ALBUTEROL SULFATE 90 UG/1
AEROSOL, METERED RESPIRATORY (INHALATION) PRN
COMMUNITY
Start: 2020-05-20

## 2021-06-07 RX ORDER — LAMOTRIGINE 150 MG/1
150 TABLET ORAL 2 TIMES DAILY
Qty: 60 TABLET | Refills: 5 | Status: SHIPPED | OUTPATIENT
Start: 2021-06-07 | End: 2021-12-13 | Stop reason: SDUPTHER

## 2021-06-07 NOTE — PROGRESS NOTES
NEUROLOGY OUT PATIENT FOLLOW UP NOTE:  6/7/20211:20 PM    Miguel Broussard is here for follow up for   Patient Active Problem List   Diagnosis    Lower abdominal pain    GERD (gastroesophageal reflux disease)    Diarrhea    Diverticulosis of large intestine without hemorrhage    Acute bronchitis    Acute frontal sinusitis    Acute pharyngitis    Acute serous otitis media    Allergic rhinitis    Allergic rhinitis due to pollen    Alopecia    Dysfunction of eustachian tube    Fatigue    Gastro-esophageal reflux disease with esophagitis    Headache    Hearing loss    Hematuria    Irritable bowel syndrome    Malaise and fatigue    Menopausal syndrome    Migraine with aura    Palpitations    Referred otalgia    Epigastric pain    Urinary tract infectious disease    Wrist joint pain       Follow up for migraine headaches. The patient reports her headaches have improved compared to last evaluation, combination of Aimovig, and Lamictal are helping with the reduction of her headaches, occasionally she can have breakthrough headaches that she uses Relpax for. She denies any side effects to the medication. She is here to go over plan. ROS:  Respiratory : no cough, no shortness of breath  Cardiac: no chest pain. No palpitations.   Renal : no flank pain, no hematuria    Skin: no rash      Allergies   Allergen Reactions    Sulfa Antibiotics Rash       Current Outpatient Medications:     albuterol sulfate  (90 Base) MCG/ACT inhaler, as needed, Disp: , Rfl:     omeprazole (PRILOSEC) 20 MG delayed release capsule, 20 mg as needed, Disp: , Rfl:     Erenumab-aooe (AIMOVIG) 70 MG/ML SOAJ, INJECT 70 MILLIGRAMS UNDER THE SKIN EVERY 30 DAYS, Disp: 1 pen, Rfl: 5    lamoTRIgine (LAMICTAL) 150 MG tablet, Take 1 tablet by mouth 2 times daily, Disp: 60 tablet, Rfl: 5    eletriptan (RELPAX) 40 MG tablet, Take 1 tablet by mouth daily as needed (migraine) may repeat in 2 hours if necessary, Disp: 9 tablet, Rfl: 3    acetaminophen (TYLENOL) 325 MG tablet, Take 650 mg by mouth every 6 hours as needed for Pain, Disp: , Rfl:     Doxycycline Hyclate 75 MG TBEC, Take 75 mg by mouth 2 times daily as needed , Disp: , Rfl:     Cholecalciferol (VITAMIN D-3) 1000 units CAPS, Take by mouth as needed , Disp: , Rfl:     NONFORMULARY, , Disp: , Rfl:     MULTIPLE VITAMINS PO, Take by mouth daily, Disp: , Rfl:     azelastine HCl 0.15 % SOLN, as needed , Disp: , Rfl:     Dapsone (ACZONE) 5 % GEL, Apply topically 2 times daily (Patient not taking: Reported on 6/7/2021), Disp: , Rfl:     Naproxen Sodium (ALEVE PO), Take by mouth as needed (Patient not taking: Reported on 6/7/2021), Disp: , Rfl:     I reviewed the past medical history, allergies, medications, social history and family history. PE:   Vitals:    06/07/21 1259   BP: 118/64   Site: Left Upper Arm   Position: Sitting   Cuff Size: Medium Adult   Pulse: 75   SpO2: 97%   Weight: 133 lb (60.3 kg)   Height: 5' (1.524 m)     General Appearance:  alert and cooperative  Skin:  Skin color, texture, turgor normal. No rashes or lesions. Gen: NAD, Language is Intact. Skin: no rash, lesion, moist to touch. warm  Head: no rash, no icterus  Neck: There is no carotid bruits. The Neck is supple. There is no neck lymphadenopathy. Neuro: CN 2-12 grossly intact with no focal deficits. Power 5/5 Throughout symmetric, Reflexes are +2 symmetric. Long tracts are intact. Cerebellar exam is Intact. Sensory exam is intact to light touch. Gait is intact. Musculoskeletal:  Has no hand arthritis, no limitation of ROM in any of the four extremities  Lower extremities no edema  The abdomen is soft,  intact bowel sounds.        DATA:    Results for orders placed or performed during the hospital encounter of 03/18/17   Sedimentation Rate   Result Value Ref Range    Sed Rate 10 0 - 20 mm/hr          MRI Brain WO Contrast     Narrative  PROCEDURE: MRI BRAIN WO CONTRAST  CLINICAL INFORMATION Migraine without aura and without status migrainosus, not intractable. Migraines for years. COMPARISON: No prior study. TECHNIQUE: Multiplanar and multiple spin echo MRI images were obtained of the brain without contrast.  FINDINGS:  The diffusion-weighted images are normal.  The brain volume is normal. There is a minimal amount of abnormal signal in the white matter of the brain. These are punctate and faint. One is seen on axial image 16 of the FLAIR sequence. Another is seen on  axial image 15. These are in the subcortical white matter of the left frontal lobe. There are no intra-or extra-axial collections. There is no hydrocephalus, midline shift or mass effect. There is no susceptibility artifact in the brain. The major intracranial vascular flow voids are present. The midline craniocervical junction structures are normal.  The pituitary gland and brainstem are normal.  Impression  1. Normal brain volume. 2. Minimal amount of abnormal signal which is faint in the subcortical white matter of the left frontal lobe. This can be related to patient history of chronic headaches. This could also represent early chronic small vessel ischemic changes. **This report has been created using voice recognition software. It may contain minor errors which are inherent in voice recognition technology. **    Final report electronically signed by Dr. Adonis Echevarria on 3/1/2017 10:29 AM           Assessment:     Diagnosis Orders   1. Migraine with aura and without status migrainosus, not intractable        She reports her migraines are well controlled with current combination of medications. Stress makes her headaches worse with severity, she is compliant. No side effects to the medication. CBC and hepatic panel done at [facility] 5/11/2021, were reviewed, normal. Her headaches are few and far apart. She uses Lamictal, Aimovig, for prevention, and Relpax for breakthrough.   After detailed discussion with patient we agreed on the following plan. 1. Aimovig 70 mg monthly. 2. Lamictal 150 mg twice a day. Refills given. 3. CBC and hepatic panel 1/2022.   4. May use Relpax for breakthrough headache. 5. Follow up in 6 months. Total time 23 min.      rGaeme Salinas MD

## 2021-06-07 NOTE — PATIENT INSTRUCTIONS
1. Continue with Aimovig 70 mg monthly. 2. Continue with Lamictal 150 mg twice a day. Refills given. 3. CBC and hepatic panel 1/2022.   4. May use Relpax for breakthrough headache. 5. Follow up in 6 months.

## 2021-12-04 DIAGNOSIS — G43.109 MIGRAINE WITH AURA AND WITHOUT STATUS MIGRAINOSUS, NOT INTRACTABLE: Primary | ICD-10-CM

## 2021-12-06 RX ORDER — ERENUMAB-AOOE 70 MG/ML
INJECTION SUBCUTANEOUS
Qty: 1 ML | Refills: 5 | Status: SHIPPED | OUTPATIENT
Start: 2021-12-06 | End: 2022-06-06

## 2021-12-13 ENCOUNTER — OFFICE VISIT (OUTPATIENT)
Dept: NEUROLOGY | Age: 46
End: 2021-12-13
Payer: COMMERCIAL

## 2021-12-13 VITALS
OXYGEN SATURATION: 98 % | SYSTOLIC BLOOD PRESSURE: 114 MMHG | HEART RATE: 60 BPM | DIASTOLIC BLOOD PRESSURE: 68 MMHG | BODY MASS INDEX: 26.9 KG/M2 | TEMPERATURE: 97.8 F | WEIGHT: 137 LBS | HEIGHT: 60 IN

## 2021-12-13 DIAGNOSIS — G43.109 MIGRAINE WITH AURA AND WITHOUT STATUS MIGRAINOSUS, NOT INTRACTABLE: Primary | ICD-10-CM

## 2021-12-13 PROCEDURE — 99213 OFFICE O/P EST LOW 20 MIN: CPT | Performed by: PSYCHIATRY & NEUROLOGY

## 2021-12-13 RX ORDER — LAMOTRIGINE 150 MG/1
150 TABLET ORAL 2 TIMES DAILY
Qty: 60 TABLET | Refills: 5 | Status: SHIPPED | OUTPATIENT
Start: 2021-12-13 | End: 2022-06-13 | Stop reason: SDUPTHER

## 2021-12-13 RX ORDER — ELETRIPTAN HYDROBROMIDE 40 MG/1
40 TABLET, FILM COATED ORAL DAILY PRN
Qty: 9 TABLET | Refills: 3 | Status: SHIPPED | OUTPATIENT
Start: 2021-12-13 | End: 2022-06-13 | Stop reason: SDUPTHER

## 2021-12-13 NOTE — PROGRESS NOTES
NEUROLOGY OUT PATIENT FOLLOW UP NOTE:  12/13/20219:36 AM    Yarelis Mathews is here for follow up for   Patient Active Problem List   Diagnosis    Lower abdominal pain    GERD (gastroesophageal reflux disease)    Diarrhea    Diverticulosis of large intestine without hemorrhage    Acute bronchitis    Acute frontal sinusitis    Acute pharyngitis    Acute serous otitis media    Allergic rhinitis    Allergic rhinitis due to pollen    Alopecia    Dysfunction of eustachian tube    Fatigue    Gastro-esophageal reflux disease with esophagitis    Headache    Hearing loss    Hematuria    Irritable bowel syndrome    Malaise and fatigue    Menopausal syndrome    Migraine with aura    Palpitations    Referred otalgia    Epigastric pain    Urinary tract infectious disease    Wrist joint pain       Follow up for migraine headaches. The patient reports her headaches have improved compared to last evaluation, combination of Aimovig, and Lamictal are helping with the reduction of her headaches, occasionally she can have breakthrough headaches that she uses Relpax for. She denies any side effects to the medication. She is here to go over plan.          Allergies   Allergen Reactions    Sulfa Antibiotics Rash       Current Outpatient Medications:     AIMOVIG 70 MG/ML SOAJ, INJECT 70 MILLIGRAMS UNDER THE SKIN EVERY 30 DAYS, Disp: 1 mL, Rfl: 5    albuterol sulfate  (90 Base) MCG/ACT inhaler, as needed, Disp: , Rfl:     omeprazole (PRILOSEC) 20 MG delayed release capsule, 20 mg as needed, Disp: , Rfl:     lamoTRIgine (LAMICTAL) 150 MG tablet, Take 1 tablet by mouth 2 times daily, Disp: 60 tablet, Rfl: 5    eletriptan (RELPAX) 40 MG tablet, Take 1 tablet by mouth daily as needed (migraine) may repeat in 2 hours if necessary, Disp: 9 tablet, Rfl: 3    acetaminophen (TYLENOL) 325 MG tablet, Take 650 mg by mouth every 6 hours as needed for Pain, Disp: , Rfl:     Doxycycline Hyclate 75 MG TBEC, Take 75 mg by mouth 2 times daily as needed , Disp: , Rfl:     Cholecalciferol (VITAMIN D-3) 1000 units CAPS, Take by mouth as needed , Disp: , Rfl:     NONFORMULARY, , Disp: , Rfl:     MULTIPLE VITAMINS PO, Take by mouth daily, Disp: , Rfl:     azelastine HCl 0.15 % SOLN, as needed , Disp: , Rfl:     Dapsone (ACZONE) 5 % GEL, Apply topically 2 times daily (Patient not taking: Reported on 6/7/2021), Disp: , Rfl:     Naproxen Sodium (ALEVE PO), Take by mouth as needed (Patient not taking: Reported on 6/7/2021), Disp: , Rfl:     I reviewed the past medical history, allergies, medications, social history and family history. PE:   Vitals:    12/13/21 0918   BP: 114/68   Site: Left Upper Arm   Position: Sitting   Cuff Size: Medium Adult   Pulse: 60   Temp: 97.8 °F (36.6 °C)   TempSrc: Skin   SpO2: 98%   Weight: 137 lb (62.1 kg)   Height: 5' (1.524 m)     General Appearance:  alert and cooperative, she is wearing a mask. Skin:  Skin color, texture, turgor normal. No rashes or lesions. Gen: NAD, Language is Intact. Skin: no rash, lesion, moist to touch. warm  Head: no rash, no icterus  Neck: There is no carotid bruits. The Neck is supple. There is no neck lymphadenopathy. Neuro: CN 2-12 grossly intact with no focal deficits. Power 5/5 Throughout symmetric, Reflexes are +2 symmetric. Long tracts are intact. Cerebellar exam is Intact. Sensory exam is intact to light touch. Gait is intact. Musculoskeletal:  Has no hand arthritis, no limitation of ROM in any of the four extremities  Lower extremities no edema        DATA:    Results for orders placed or performed during the hospital encounter of 03/18/17   Sedimentation Rate   Result Value Ref Range    Sed Rate 10 0 - 20 mm/hr          MRI Brain WO Contrast     Narrative  PROCEDURE: MRI BRAIN WO CONTRAST  CLINICAL INFORMATION Migraine without aura and without status migrainosus, not intractable. Migraines for years. COMPARISON: No prior study.   TECHNIQUE: Multiplanar and multiple spin echo MRI images were obtained of the brain without contrast.  FINDINGS:  The diffusion-weighted images are normal.  The brain volume is normal. There is a minimal amount of abnormal signal in the white matter of the brain. These are punctate and faint. One is seen on axial image 16 of the FLAIR sequence. Another is seen on  axial image 15. These are in the subcortical white matter of the left frontal lobe. There are no intra-or extra-axial collections. There is no hydrocephalus, midline shift or mass effect. There is no susceptibility artifact in the brain. The major intracranial vascular flow voids are present. The midline craniocervical junction structures are normal.  The pituitary gland and brainstem are normal.  Impression  1. Normal brain volume. 2. Minimal amount of abnormal signal which is faint in the subcortical white matter of the left frontal lobe. This can be related to patient history of chronic headaches. This could also represent early chronic small vessel ischemic changes. **This report has been created using voice recognition software. It may contain minor errors which are inherent in voice recognition technology. **    Final report electronically signed by Dr. Gerri Trammell on 3/1/2017 10:29 AM        Assessment:     Diagnosis Orders   1. Migraine with aura and without status migrainosus, not intractable        She reports her migraines are well controlled with current combination of medications. She can have increased headache with stress with frequency, she is compliant. No side effects to the medication. CBC and hepatic panel done at [facility] 12/4/2021  were reviewed, normal. Her headaches are few and far apart. She uses Lamictal, Aimovig, for prevention, and Relpax for breakthrough. After detailed discussion with patient we agreed on the following plan. 1. Aimovig 70 mg monthly. 2. Lamictal 150 mg twice a day. Refills given.   3. CBC and

## 2022-06-06 DIAGNOSIS — G43.109 MIGRAINE WITH AURA AND WITHOUT STATUS MIGRAINOSUS, NOT INTRACTABLE: ICD-10-CM

## 2022-06-06 RX ORDER — ERENUMAB-AOOE 70 MG/ML
INJECTION SUBCUTANEOUS
Qty: 1 ML | Refills: 5 | Status: SHIPPED | OUTPATIENT
Start: 2022-06-06

## 2022-06-13 ENCOUNTER — OFFICE VISIT (OUTPATIENT)
Dept: NEUROLOGY | Age: 47
End: 2022-06-13
Payer: COMMERCIAL

## 2022-06-13 VITALS
WEIGHT: 132 LBS | BODY MASS INDEX: 21.99 KG/M2 | HEIGHT: 65 IN | DIASTOLIC BLOOD PRESSURE: 80 MMHG | OXYGEN SATURATION: 100 % | HEART RATE: 64 BPM | SYSTOLIC BLOOD PRESSURE: 112 MMHG

## 2022-06-13 DIAGNOSIS — G43.109 MIGRAINE WITH AURA AND WITHOUT STATUS MIGRAINOSUS, NOT INTRACTABLE: Primary | ICD-10-CM

## 2022-06-13 PROCEDURE — 99213 OFFICE O/P EST LOW 20 MIN: CPT | Performed by: NURSE PRACTITIONER

## 2022-06-13 RX ORDER — LAMOTRIGINE 150 MG/1
150 TABLET ORAL 2 TIMES DAILY
Qty: 60 TABLET | Refills: 5 | Status: SHIPPED | OUTPATIENT
Start: 2022-06-13

## 2022-06-13 RX ORDER — ELETRIPTAN HYDROBROMIDE 40 MG/1
40 TABLET, FILM COATED ORAL DAILY PRN
Qty: 9 TABLET | Refills: 3 | Status: SHIPPED | OUTPATIENT
Start: 2022-06-13

## 2022-06-13 RX ORDER — LOTEPREDNOL ETABONATE 5 MG/ML
1 SUSPENSION/ DROPS OPHTHALMIC 2 TIMES DAILY
COMMUNITY

## 2022-06-13 NOTE — PROGRESS NOTES
NEUROLOGY OUT PATIENT FOLLOW UP NOTE:  6/13/20228:24 AM    Carlene Ragsdale is here for follow up for headache. ROS:  Respiratory : no cough, no shortness of breath  Cardiac: no chest pain. No palpitations.   Renal : no flank pain, no hematuria    Skin: no rash      Allergies   Allergen Reactions    Sulfa Antibiotics Rash       Current Outpatient Medications:     loteprednol (LOTEMAX) 0.5 % ophthalmic suspension, Place 1 drop into both eyes in the morning and at bedtime, Disp: , Rfl:     Erenumab-aooe (AIMOVIG) 70 MG/ML SOAJ, INJECT 70 MILLIGRAMS (ONE PEN) UNDER THE SKIN ONCE MONTHLY, Disp: 1 mL, Rfl: 5    lamoTRIgine (LAMICTAL) 150 MG tablet, Take 1 tablet by mouth 2 times daily, Disp: 60 tablet, Rfl: 5    eletriptan (RELPAX) 40 MG tablet, Take 1 tablet by mouth daily as needed (migraine) may repeat in 2 hours if necessary, Disp: 9 tablet, Rfl: 3    albuterol sulfate  (90 Base) MCG/ACT inhaler, as needed, Disp: , Rfl:     omeprazole (PRILOSEC) 20 MG delayed release capsule, 20 mg as needed, Disp: , Rfl:     acetaminophen (TYLENOL) 325 MG tablet, Take 650 mg by mouth every 6 hours as needed for Pain, Disp: , Rfl:     NONFORMULARY, , Disp: , Rfl:     MULTIPLE VITAMINS PO, Take by mouth daily, Disp: , Rfl:     Doxycycline Hyclate 75 MG TBEC, Take 75 mg by mouth 2 times daily as needed  (Patient not taking: Reported on 6/13/2022), Disp: , Rfl:     Cholecalciferol (VITAMIN D-3) 1000 units CAPS, Take by mouth as needed  (Patient not taking: Reported on 6/13/2022), Disp: , Rfl:     Dapsone (ACZONE) 5 % GEL, Apply topically 2 times daily (Patient not taking: Reported on 6/7/2021), Disp: , Rfl:     Naproxen Sodium (ALEVE PO), Take by mouth as needed (Patient not taking: Reported on 6/7/2021), Disp: , Rfl:     azelastine HCl 0.15 % SOLN, as needed  (Patient not taking: Reported on 6/13/2022), Disp: , Rfl:     I reviewed the past medical history, allergies, medications, social history and family voids are present. The midline craniocervical junction structures are normal.  The pituitary gland and brainstem are normal.    Impression  1. Normal brain volume. 2. Minimal amount of abnormal signal which is faint in the subcortical white matter of the left frontal lobe. This can be related to patient history of chronic headaches. This could also represent early chronic small vessel ischemic changes. **This report has been created using voice recognition software. It may contain minor errors which are inherent in voice recognition technology. **    Final report electronically signed by Dr. Brian Kelsey on 3/1/2017 10:29 AM    Results for orders placed in visit on 08/07/20    MRI BRAIN W WO CONTRAST    No results found for this or any previous visit. No results found for this or any previous visit. CBC HFP done at [facility] 5/21/22:         Assessment:     Diagnosis Orders   1. Migraine with aura and without status migrainosus, not intractable          She feels her headaches are well controlled with her current medications. She is on aimovig, lamictal, and relpax for breakthrough headache. Her headaches are few and far apart. Overall she is pleased with how she is doing. After detailed discussion with patient we agreed on the following plan. Plan:  1. Continue with Aimovig 70 mg monthly injection. 2. Lamictal 150 mg twice a day. Refills given. 3. CBC and hepatic panel 11/2022.  4. May use Relpax for breakthrough headache. Refills given  5. Keep headache diary  6.  Follow up in 6 months      Total time 24 min    SONIA Ortiz - CNP

## 2022-06-13 NOTE — PATIENT INSTRUCTIONS
1. Continue with Aimovig 70 mg monthly injection. 2. Lamictal 150 mg twice a day. Refills given. 3. CBC and hepatic panel 11/2022.  4. May use Relpax for breakthrough headache. Refills given  5. Keep headache diary  6.  Follow up in 6 months

## 2022-12-02 DIAGNOSIS — G43.109 MIGRAINE WITH AURA AND WITHOUT STATUS MIGRAINOSUS, NOT INTRACTABLE: ICD-10-CM

## 2022-12-02 RX ORDER — ERENUMAB-AOOE 70 MG/ML
INJECTION SUBCUTANEOUS
Qty: 1 ML | Refills: 5 | Status: SHIPPED | OUTPATIENT
Start: 2022-12-02

## 2022-12-12 ENCOUNTER — OFFICE VISIT (OUTPATIENT)
Dept: NEUROLOGY | Age: 47
End: 2022-12-12
Payer: COMMERCIAL

## 2022-12-12 VITALS
HEART RATE: 73 BPM | WEIGHT: 127 LBS | OXYGEN SATURATION: 99 % | BODY MASS INDEX: 24.94 KG/M2 | HEIGHT: 60 IN | SYSTOLIC BLOOD PRESSURE: 105 MMHG | DIASTOLIC BLOOD PRESSURE: 70 MMHG

## 2022-12-12 DIAGNOSIS — G43.109 MIGRAINE WITH AURA AND WITHOUT STATUS MIGRAINOSUS, NOT INTRACTABLE: Primary | ICD-10-CM

## 2022-12-12 DIAGNOSIS — G25.2 ACTION TREMOR: ICD-10-CM

## 2022-12-12 PROCEDURE — 99214 OFFICE O/P EST MOD 30 MIN: CPT | Performed by: PSYCHIATRY & NEUROLOGY

## 2022-12-12 RX ORDER — SPIRONOLACTONE 50 MG/1
50 TABLET, FILM COATED ORAL DAILY
COMMUNITY

## 2022-12-12 RX ORDER — CYCLOSPORINE 0 G/ML
SOLUTION/ DROPS OPHTHALMIC; TOPICAL
COMMUNITY

## 2022-12-12 RX ORDER — TRAZODONE HYDROCHLORIDE 50 MG/1
50 TABLET ORAL NIGHTLY
COMMUNITY

## 2022-12-12 RX ORDER — TOPIRAMATE 25 MG/1
25 TABLET ORAL DAILY
Qty: 30 TABLET | Refills: 3 | Status: SHIPPED | OUTPATIENT
Start: 2022-12-12

## 2022-12-12 RX ORDER — PILOCARPINE HYDROCHLORIDE 12.5 MG/ML
SOLUTION/ DROPS OPHTHALMIC
COMMUNITY

## 2022-12-12 NOTE — PROGRESS NOTES
NEUROLOGY OUT PATIENT FOLLOW UP NOTE:  12/12/20228:48 AM    Wade Lua is here for follow up for headache. Allergies   Allergen Reactions    Sulfa Antibiotics Rash       Current Outpatient Medications:     traZODone (DESYREL) 50 MG tablet, Take 50 mg by mouth nightly, Disp: , Rfl:     spironolactone (ALDACTONE) 50 MG tablet, Take 50 mg by mouth daily, Disp: , Rfl:     Pilocarpine HCl (VUITY) 1.25 % SOLN, Apply to eye, Disp: , Rfl:     cycloSPORINE, PF, (CEQUA) 0.09 % SOLN, Apply to eye, Disp: , Rfl:     Erenumab-aooe (AIMOVIG) 70 MG/ML SOAJ, INJECT ONE SYRINGE UNDER THE SKIN EVERY MONTH, Disp: 1 mL, Rfl: 5    lamoTRIgine (LAMICTAL) 150 MG tablet, Take 1 tablet by mouth 2 times daily, Disp: 60 tablet, Rfl: 5    eletriptan (RELPAX) 40 MG tablet, Take 1 tablet by mouth daily as needed (migraine) may repeat in 2 hours if necessary, Disp: 9 tablet, Rfl: 3    albuterol sulfate  (90 Base) MCG/ACT inhaler, as needed, Disp: , Rfl:     omeprazole (PRILOSEC) 20 MG delayed release capsule, 20 mg as needed, Disp: , Rfl:     acetaminophen (TYLENOL) 325 MG tablet, Take 650 mg by mouth every 6 hours as needed for Pain, Disp: , Rfl:     Cholecalciferol (VITAMIN D-3) 1000 units CAPS, Take by mouth as needed, Disp: , Rfl:     NONFORMULARY, , Disp: , Rfl:     MULTIPLE VITAMINS PO, Take by mouth daily, Disp: , Rfl:     I reviewed the past medical history, allergies, medications, social history and family history. PE:   Vitals:    12/12/22 0842   BP: 105/70   Site: Left Upper Arm   Position: Sitting   Cuff Size: Medium Adult   Pulse: 73   SpO2: 99%   Weight: 127 lb (57.6 kg)   Height: 5' (1.524 m)     General Appearance:  awake, alert, oriented, in no acute distress  Gen: NAD, Language is Intact. Skin: no rash, lesion, dry  to touch. warm  Head: no rash, no icterus  Neck: There is no carotid bruits. The Neck is supple. There is no neck lymphadenopathy. Neuro: CN 2-12 grossly intact with no focal deficits.  Power 5/5 Throughout symmetric, Reflexes are +2 symmetric. Long tracts are intact. Cerebellar exam is Intact. Sensory exam is intact to light touch. Gait is intact. There is very mild symmetric action tremor, no head tremor, no voice tremor. There is no resting tremor, no bradykinesia, normal gait. Musculoskeletal:  Has no hand arthritis, no limitation of ROM in any of the four extremities. Lower extremities no edema          DATA:      Results for orders placed or performed during the hospital encounter of 03/18/17   Sedimentation Rate   Result Value Ref Range    Sed Rate 10 0 - 20 mm/hr        MRI Brain WO Contrast    Narrative  PROCEDURE: MRI BRAIN WO CONTRAST    CLINICAL INFORMATION Migraine without aura and without status migrainosus, not intractable. Migraines for years. COMPARISON: No prior study. TECHNIQUE: Multiplanar and multiple spin echo MRI images were obtained of the brain without contrast.    FINDINGS:  The diffusion-weighted images are normal.  The brain volume is normal. There is a minimal amount of abnormal signal in the white matter of the brain. These are punctate and faint. One is seen on axial image 16 of the FLAIR sequence. Another is seen on  axial image 15. These are in the subcortical white matter of the left frontal lobe. There are no intra-or extra-axial collections. There is no hydrocephalus, midline shift or mass effect. There is no susceptibility artifact in the brain. The major intracranial vascular flow voids are present. The midline craniocervical junction structures are normal.  The pituitary gland and brainstem are normal.  Impression  1. Normal brain volume. 2. Minimal amount of abnormal signal which is faint in the subcortical white matter of the left frontal lobe. This can be related to patient history of chronic headaches. This could also represent early chronic small vessel ischemic changes. **This report has been created using voice recognition software.  It may contain minor errors which are inherent in voice recognition technology. **    Final report electronically signed by Dr. Binh Cade on 3/1/2017 10:29 AM      MRI BRAIN W WO CONTRAST            Assessment:     Diagnosis Orders   1. Migraine with aura and without status migrainosus, not intractable        2. Action tremor             Follow up for migraine headache. The patient states her headaches have been stable, she is on Lamictal, Aimovig and as needed Relpax, medications tolerated well no side effects. She reports the headache are less frequent. She had some trouble applying the aimovig shots due to shoulder immobilization, that has since got better. She is however complaining of bilateral hand tremor for the last one year or so, progressive, more pronounced with doing fine motor functions, example she gave me was struggling taking photos with her phone, and other fine motor skill. She reports she is on no new medications prior to the onset. She denies resting element. Her father had tremor when he got older. Exam shows bilateral action mild tremor, no head tremor, no voice tremor. The patient was counseled about her symptoms and work up recommended, she was also counseled about medication options and side effects. She is interested in medications. After detailed discussion with patient we agreed on the following plan. Plan:  Continue with Aimovig 70 mg monthly injection. Lamictal 150 mg twice a day. Refills given. Topamax 25 mg tablet daily for one week then 50 mg daily there after. Take with plenty of fluids  TSH, T4   May use Relpax for breakthrough headache.  Refills given  Keep headache diary  Follow up in 6 months      Total time 32 min    Marbella Holly MD

## 2022-12-12 NOTE — PATIENT INSTRUCTIONS
Continue with Aimovig 70 mg monthly injection. Lamictal 150 mg twice a day. Refills given. Topamax 25 mg tablet daily for one week then 50 mg daily there after. Take with plenty of fluids  TSH, T4   May use Relpax for breakthrough headache.  Refills given  Keep headache diary  Follow up in 6 months

## 2022-12-29 DIAGNOSIS — G25.2 ACTION TREMOR: ICD-10-CM

## 2022-12-29 DIAGNOSIS — G43.109 MIGRAINE WITH AURA AND WITHOUT STATUS MIGRAINOSUS, NOT INTRACTABLE: Primary | ICD-10-CM

## 2022-12-29 RX ORDER — GABAPENTIN 100 MG/1
100 CAPSULE ORAL 2 TIMES DAILY
Qty: 60 CAPSULE | Refills: 2 | Status: SHIPPED | OUTPATIENT
Start: 2022-12-29 | End: 2023-03-28

## 2022-12-29 NOTE — TELEPHONE ENCOUNTER
Stop Topamax due to side effects. If interested we can try a low dose of Neurontin 100 mg twice a day, to see if it helps with the tremor and headache.    Fabio Berrios MD

## 2022-12-29 NOTE — TELEPHONE ENCOUNTER
Patient sent [employer] message stating she is taking the Topamax for her tremors since last visit. She feels that her shaking and headaches have gotten worse since starting the Topamax. She does not like the side effects of the Topamax. She has tried effexor, lamictal, frova, relpax, Topamax, Depakote in the past. She is next due for Aimovig 70 mg injection on 1/20/23. Please advise. Thank you.

## 2023-01-10 DIAGNOSIS — G43.109 MIGRAINE WITH AURA AND WITHOUT STATUS MIGRAINOSUS, NOT INTRACTABLE: ICD-10-CM

## 2023-01-10 RX ORDER — LAMOTRIGINE 150 MG/1
TABLET ORAL
Qty: 60 TABLET | Refills: 5 | Status: SHIPPED | OUTPATIENT
Start: 2023-01-10

## 2023-03-14 DIAGNOSIS — G43.109 MIGRAINE WITH AURA AND WITHOUT STATUS MIGRAINOSUS, NOT INTRACTABLE: ICD-10-CM

## 2023-03-14 DIAGNOSIS — G25.2 ACTION TREMOR: ICD-10-CM

## 2023-03-15 RX ORDER — GABAPENTIN 100 MG/1
100 CAPSULE ORAL 2 TIMES DAILY
Qty: 60 CAPSULE | Refills: 3 | Status: SHIPPED | OUTPATIENT
Start: 2023-03-15 | End: 2023-07-18

## 2023-03-15 RX ORDER — ERENUMAB-AOOE 70 MG/ML
INJECTION SUBCUTANEOUS
Qty: 1 ML | Refills: 5 | OUTPATIENT
Start: 2023-03-15

## 2023-04-20 ENCOUNTER — PATIENT MESSAGE (OUTPATIENT)
Dept: NEUROLOGY | Age: 48
End: 2023-04-20

## 2023-04-20 DIAGNOSIS — G43.109 MIGRAINE WITH AURA AND WITHOUT STATUS MIGRAINOSUS, NOT INTRACTABLE: Primary | ICD-10-CM

## 2023-04-20 RX ORDER — ELETRIPTAN HYDROBROMIDE 40 MG/1
40 TABLET, FILM COATED ORAL DAILY PRN
Qty: 9 TABLET | Refills: 3 | Status: SHIPPED | OUTPATIENT
Start: 2023-04-20

## 2023-04-20 NOTE — TELEPHONE ENCOUNTER
Please approve or deny     Last Visit Date:  12/12/2022   Narcisa oCulter     Next Visit Date:    6/19/2023

## 2023-06-06 DIAGNOSIS — G43.109 MIGRAINE WITH AURA AND WITHOUT STATUS MIGRAINOSUS, NOT INTRACTABLE: ICD-10-CM

## 2023-06-06 RX ORDER — ERENUMAB-AOOE 70 MG/ML
INJECTION SUBCUTANEOUS
Qty: 1 ML | Refills: 5 | Status: SHIPPED | OUTPATIENT
Start: 2023-06-06

## 2023-06-27 DIAGNOSIS — G43.109 MIGRAINE WITH AURA AND WITHOUT STATUS MIGRAINOSUS, NOT INTRACTABLE: ICD-10-CM

## 2023-06-27 RX ORDER — LAMOTRIGINE 150 MG/1
150 TABLET ORAL 2 TIMES DAILY
Qty: 60 TABLET | Refills: 5 | Status: SHIPPED | OUTPATIENT
Start: 2023-06-27

## 2023-07-21 ENCOUNTER — OFFICE VISIT (OUTPATIENT)
Dept: NEUROLOGY | Age: 48
End: 2023-07-21
Payer: COMMERCIAL

## 2023-07-21 VITALS
OXYGEN SATURATION: 98 % | SYSTOLIC BLOOD PRESSURE: 105 MMHG | HEIGHT: 60 IN | WEIGHT: 131 LBS | BODY MASS INDEX: 25.72 KG/M2 | DIASTOLIC BLOOD PRESSURE: 65 MMHG | HEART RATE: 77 BPM

## 2023-07-21 DIAGNOSIS — G25.2 ACTION TREMOR: ICD-10-CM

## 2023-07-21 DIAGNOSIS — G43.109 MIGRAINE WITH AURA AND WITHOUT STATUS MIGRAINOSUS, NOT INTRACTABLE: Primary | ICD-10-CM

## 2023-07-21 PROCEDURE — 99214 OFFICE O/P EST MOD 30 MIN: CPT | Performed by: PSYCHIATRY & NEUROLOGY

## 2023-07-21 RX ORDER — GABAPENTIN 100 MG/1
200 CAPSULE ORAL 2 TIMES DAILY
Qty: 120 CAPSULE | Refills: 8 | Status: SHIPPED | OUTPATIENT
Start: 2023-07-21 | End: 2024-04-16

## 2023-07-21 RX ORDER — ERENUMAB-AOOE 140 MG/ML
140 INJECTION, SOLUTION SUBCUTANEOUS
Qty: 1 ADJUSTABLE DOSE PRE-FILLED PEN SYRINGE | Refills: 7 | Status: SHIPPED | OUTPATIENT
Start: 2023-07-21

## 2023-07-21 NOTE — PATIENT INSTRUCTIONS
Change Aimovig to 140 mg monthly injection. Change Neurontin to 200 mg twice a day. Lamictal 150 mg twice a day. Refills given. CBC, lamictal level, hepatic panel ordered. May use Relpax 40 mg daily as needed for for breakthrough headache.  Refills given  Keep headache diary  Follow up in 6 months

## 2023-07-21 NOTE — PROGRESS NOTES
Appearance:  awake, alert, oriented, in no distress  Gen: NAD, Language is Intact. Skin: no rash, lesion, dry  to touch. warm  Head: no rash, no icterus  Neck: There is no carotid bruits. The Neck is supple. Neuro: CN 2-12 grossly intact with no focal deficits. Power 5/5 Throughout symmetric, Reflexes are +2 symmetric. Long tracts are intact. Cerebellar exam is Intact. Sensory exam is intact to light touch. Gait is intact. There is very mild symmetric action tremor, no head tremor, no voice tremor. There is no resting tremor, no bradykinesia, normal gait. Musculoskeletal:  Has no hand arthritis, no limitation of ROM in any of the four extremities. Lower extremities no edema          DATA:      Results for orders placed or performed during the hospital encounter of 03/18/17   Sedimentation Rate   Result Value Ref Range    Sed Rate 10 0 - 20 mm/hr        MRI Brain WO Contrast    Narrative  PROCEDURE: MRI BRAIN WO CONTRAST    CLINICAL INFORMATION Migraine without aura and without status migrainosus, not intractable. Migraines for years. COMPARISON: No prior study. TECHNIQUE: Multiplanar and multiple spin echo MRI images were obtained of the brain without contrast.    FINDINGS:  The diffusion-weighted images are normal.  The brain volume is normal. There is a minimal amount of abnormal signal in the white matter of the brain. These are punctate and faint. One is seen on axial image 16 of the FLAIR sequence. Another is seen on  axial image 15. These are in the subcortical white matter of the left frontal lobe. There are no intra-or extra-axial collections. There is no hydrocephalus, midline shift or mass effect. There is no susceptibility artifact in the brain. The major intracranial vascular flow voids are present. The midline craniocervical junction structures are normal.  The pituitary gland and brainstem are normal.  Impression  1. Normal brain volume.   2. Minimal amount of abnormal signal which

## 2023-12-23 DIAGNOSIS — G43.109 MIGRAINE WITH AURA AND WITHOUT STATUS MIGRAINOSUS, NOT INTRACTABLE: ICD-10-CM

## 2023-12-26 RX ORDER — LAMOTRIGINE 150 MG/1
150 TABLET ORAL 2 TIMES DAILY
Qty: 60 TABLET | Refills: 5 | Status: SHIPPED | OUTPATIENT
Start: 2023-12-26

## 2024-01-26 ENCOUNTER — OFFICE VISIT (OUTPATIENT)
Dept: NEUROLOGY | Age: 49
End: 2024-01-26
Payer: COMMERCIAL

## 2024-01-26 VITALS
SYSTOLIC BLOOD PRESSURE: 168 MMHG | WEIGHT: 124 LBS | OXYGEN SATURATION: 97 % | DIASTOLIC BLOOD PRESSURE: 68 MMHG | HEIGHT: 60 IN | BODY MASS INDEX: 24.35 KG/M2 | HEART RATE: 77 BPM

## 2024-01-26 DIAGNOSIS — G25.2 ACTION TREMOR: ICD-10-CM

## 2024-01-26 DIAGNOSIS — G43.109 MIGRAINE WITH AURA AND WITHOUT STATUS MIGRAINOSUS, NOT INTRACTABLE: Primary | ICD-10-CM

## 2024-01-26 PROCEDURE — 99213 OFFICE O/P EST LOW 20 MIN: CPT | Performed by: NURSE PRACTITIONER

## 2024-01-26 RX ORDER — ERENUMAB-AOOE 140 MG/ML
140 INJECTION, SOLUTION SUBCUTANEOUS
Qty: 1 ADJUSTABLE DOSE PRE-FILLED PEN SYRINGE | Refills: 7 | Status: SHIPPED | OUTPATIENT
Start: 2024-01-26

## 2024-01-26 RX ORDER — GABAPENTIN 100 MG/1
200 CAPSULE ORAL 2 TIMES DAILY
Qty: 120 CAPSULE | Refills: 8 | Status: SHIPPED | OUTPATIENT
Start: 2024-01-26 | End: 2024-10-22

## 2024-01-26 NOTE — PATIENT INSTRUCTIONS
Obtain Lamictal level from [place] for review  Continue with  Aimovig to 140 mg monthly injection.   Continue  with Neurontin to 200 mg twice a day.   Continue with Lamictal 150 mg twice a day. Refills given.  CBC, lamictal level, hepatic panel ordered to be done 7/2024.   May use Relpax 40 mg daily as needed for for breakthrough headache. Refills given  Please measure your blood pressure and pulse during spells.   Keep headache diary  Follow up in 6 months

## 2024-01-26 NOTE — PROGRESS NOTES
Language is Intact. Skin: no rash, lesion, dry  to touch. warm  Head: no rash, no icterus  Neck: There is no carotid bruits. The Neck is supple. Neuro: CN 2-12 grossly intact with no focal deficits. Power 5/5 Throughout symmetric, Reflexes are +2 symmetric. Long tracts are intact. Cerebellar exam is Intact. Sensory exam is intact to light touch.  Gait is intact. There is very mild symmetric action tremor, no head tremor, no voice tremor. There is no resting tremor, no bradykinesia, normal gait.   Musculoskeletal:  Has no hand arthritis, no limitation of ROM in any of the four extremities.  Lower extremities no edema        DATA:         Results for orders placed or performed during the hospital encounter of 03/18/17   Sedimentation Rate   Result Value Ref Range    Sed Rate 10 0 - 20 mm/hr           Results for orders placed during the hospital encounter of 02/28/17    MRI Brain WO Contrast    Narrative  PROCEDURE: MRI BRAIN WO CONTRAST    CLINICAL INFORMATION Migraine without aura and without status migrainosus, not intractable.  Migraines for years.    COMPARISON: No prior study.    TECHNIQUE: Multiplanar and multiple spin echo MRI images were obtained of the brain without contrast.    FINDINGS:        The diffusion-weighted images are normal.  The brain volume is normal. There is a minimal amount of abnormal signal in the white matter of the brain. These are punctate and faint. One is seen on axial image 16 of the FLAIR sequence. Another is seen on  axial image 15. These are in the subcortical white matter of the left frontal lobe.    There are no intra-or extra-axial collections.  There is no hydrocephalus, midline shift or mass effect.    There is no susceptibility artifact in the brain.      The major intracranial vascular flow voids are present.    The midline craniocervical junction structures are normal.  The pituitary gland and brainstem are normal.    Impression  1. Normal brain volume.  2. Minimal amount

## 2024-02-05 ENCOUNTER — PATIENT MESSAGE (OUTPATIENT)
Dept: NEUROLOGY | Age: 49
End: 2024-02-05

## 2024-02-22 DIAGNOSIS — G43.109 MIGRAINE WITH AURA AND WITHOUT STATUS MIGRAINOSUS, NOT INTRACTABLE: ICD-10-CM

## 2024-02-22 RX ORDER — ERENUMAB-AOOE 140 MG/ML
140 INJECTION, SOLUTION SUBCUTANEOUS
Qty: 1 ADJUSTABLE DOSE PRE-FILLED PEN SYRINGE | Refills: 7 | OUTPATIENT
Start: 2024-02-22

## 2024-04-01 DIAGNOSIS — G43.109 MIGRAINE WITH AURA AND WITHOUT STATUS MIGRAINOSUS, NOT INTRACTABLE: ICD-10-CM

## 2024-04-01 RX ORDER — ERENUMAB-AOOE 140 MG/ML
140 INJECTION, SOLUTION SUBCUTANEOUS
Qty: 1 ML | OUTPATIENT
Start: 2024-04-01

## 2024-04-05 DIAGNOSIS — G43.109 MIGRAINE WITH AURA AND WITHOUT STATUS MIGRAINOSUS, NOT INTRACTABLE: ICD-10-CM

## 2024-04-05 RX ORDER — ERENUMAB-AOOE 140 MG/ML
140 INJECTION, SOLUTION SUBCUTANEOUS
Qty: 1 ML | OUTPATIENT
Start: 2024-04-05

## 2024-07-09 LAB — LAMOTRIGINE LEVEL: 7.8 MCG/ML (ref 2.5–15)

## 2024-07-12 ENCOUNTER — PATIENT MESSAGE (OUTPATIENT)
Dept: NEUROLOGY | Age: 49
End: 2024-07-12

## 2024-07-12 DIAGNOSIS — G43.109 MIGRAINE WITH AURA AND WITHOUT STATUS MIGRAINOSUS, NOT INTRACTABLE: ICD-10-CM

## 2024-07-15 RX ORDER — LAMOTRIGINE 150 MG/1
150 TABLET ORAL 2 TIMES DAILY
Qty: 60 TABLET | Refills: 5 | Status: SHIPPED | OUTPATIENT
Start: 2024-07-15

## 2024-07-15 NOTE — TELEPHONE ENCOUNTER
From: Darby Dhillon  To: Dr. Giovani Drew  Sent: 7/12/2024 5:25 PM EDT  Subject: Lamictal     Hello,   I need a refill sent to [facility] in [place].  They have sent a request to the office, but have not heard back. I only have 4 days of medication left.     Thank you

## 2024-07-15 NOTE — TELEPHONE ENCOUNTER
Darby Dhillon called requesting a refill on the following medications:  Requested Prescriptions     Pending Prescriptions Disp Refills    lamoTRIgine (LAMICTAL) 150 MG tablet 60 tablet 5     Sig: Take 1 tablet by mouth 2 times daily       Date of last visit: 1/26/2024- Paige Leopold, CNP  Date of next visit (if applicable):7/19/2024- Dr. Drew  Date of last refill: 12/26/23  Pharmacy Name: [facility] L[facility]

## 2024-07-19 ENCOUNTER — OFFICE VISIT (OUTPATIENT)
Dept: NEUROLOGY | Age: 49
End: 2024-07-19
Payer: COMMERCIAL

## 2024-07-19 VITALS
SYSTOLIC BLOOD PRESSURE: 105 MMHG | OXYGEN SATURATION: 99 % | HEIGHT: 60 IN | BODY MASS INDEX: 24.74 KG/M2 | WEIGHT: 126 LBS | DIASTOLIC BLOOD PRESSURE: 65 MMHG | HEART RATE: 69 BPM

## 2024-07-19 DIAGNOSIS — G43.109 MIGRAINE WITH AURA AND WITHOUT STATUS MIGRAINOSUS, NOT INTRACTABLE: Primary | ICD-10-CM

## 2024-07-19 PROCEDURE — 99213 OFFICE O/P EST LOW 20 MIN: CPT | Performed by: PSYCHIATRY & NEUROLOGY

## 2024-07-19 PROCEDURE — G8420 CALC BMI NORM PARAMETERS: HCPCS | Performed by: PSYCHIATRY & NEUROLOGY

## 2024-07-19 PROCEDURE — 4004F PT TOBACCO SCREEN RCVD TLK: CPT | Performed by: PSYCHIATRY & NEUROLOGY

## 2024-07-19 PROCEDURE — 3017F COLORECTAL CA SCREEN DOC REV: CPT | Performed by: PSYCHIATRY & NEUROLOGY

## 2024-07-19 PROCEDURE — G8427 DOCREV CUR MEDS BY ELIG CLIN: HCPCS | Performed by: PSYCHIATRY & NEUROLOGY

## 2024-07-19 RX ORDER — ELETRIPTAN HYDROBROMIDE 40 MG/1
40 TABLET, FILM COATED ORAL DAILY PRN
Qty: 9 TABLET | Refills: 3 | Status: SHIPPED | OUTPATIENT
Start: 2024-07-19

## 2024-07-19 RX ORDER — ERENUMAB-AOOE 140 MG/ML
140 INJECTION, SOLUTION SUBCUTANEOUS
Qty: 1 ADJUSTABLE DOSE PRE-FILLED PEN SYRINGE | Refills: 7 | Status: SHIPPED | OUTPATIENT
Start: 2024-07-19

## 2024-07-19 NOTE — PROGRESS NOTES
NEUROLOGY OUT PATIENT FOLLOW UP NOTE:  7/19/20248:52 AM    Darby Dhillon is here for follow up for headache, tremor.         Allergies   Allergen Reactions    Sulfa Antibiotics Rash       Current Outpatient Medications:     lamoTRIgine (LAMICTAL) 150 MG tablet, Take 1 tablet by mouth 2 times daily, Disp: 60 tablet, Rfl: 5    Erenumab-aooe (AIMOVIG) 140 MG/ML SOAJ, Inject 140 mg into the skin every 30 days, Disp: 1 Adjustable Dose Pre-filled Pen Syringe, Rfl: 7    gabapentin (NEURONTIN) 100 MG capsule, Take 2 capsules by mouth 2 times daily for 270 days. Intended supply: 90 days, Disp: 120 capsule, Rfl: 8    L-Glutamine 500 MG CAPS, Take by mouth, Disp: , Rfl:     Potassium 99 MG TABS, Take by mouth, Disp: , Rfl:     eletriptan (RELPAX) 40 MG tablet, Take 1 tablet by mouth daily as needed (migraine) may repeat in 2 hours if necessary, Disp: 9 tablet, Rfl: 3    Pilocarpine HCl (VUITY) 1.25 % SOLN, Apply to eye, Disp: , Rfl:     cycloSPORINE, PF, (CEQUA) 0.09 % SOLN, Apply to eye, Disp: , Rfl:     albuterol sulfate  (90 Base) MCG/ACT inhaler, as needed, Disp: , Rfl:     omeprazole (PRILOSEC) 20 MG delayed release capsule, 1 capsule as needed, Disp: , Rfl:     acetaminophen (TYLENOL) 325 MG tablet, Take 2 tablets by mouth every 6 hours as needed for Pain, Disp: , Rfl:     Cholecalciferol (VITAMIN D-3) 1000 units CAPS, Take by mouth as needed, Disp: , Rfl:     NONFORMULARY, , Disp: , Rfl:     MULTIPLE VITAMINS PO, Take by mouth daily, Disp: , Rfl:     I reviewed the past medical history, allergies, medications, social history and family history.       PE:   Vitals:    07/19/24 0847   BP: 105/65   Site: Right Upper Arm   Position: Sitting   Cuff Size: Medium Adult   Pulse: 69   SpO2: 99%   Weight: 57.2 kg (126 lb)   Height: 1.524 m (5')        General Appearance:  awake, alert, oriented, in no distress  Gen: NAD, Language is Intact. Skin: no rash, lesion, dry  to touch. warm  Head: no rash, no icterus  Neck:

## 2024-07-19 NOTE — PATIENT INSTRUCTIONS
Continue with  Aimovig to 140 mg monthly injection.   Continue  with Neurontin to 200 mg twice a day.   Continue with Lamictal 150 mg twice a day. Refills given.  Repeat LFT in 8/2024.    May use Relpax 40 mg daily as needed for for breakthrough headache.   Follow up in 6 months

## 2024-09-30 DIAGNOSIS — G43.109 MIGRAINE WITH AURA AND WITHOUT STATUS MIGRAINOSUS, NOT INTRACTABLE: ICD-10-CM

## 2024-09-30 RX ORDER — ERENUMAB-AOOE 140 MG/ML
140 INJECTION, SOLUTION SUBCUTANEOUS
Qty: 1 ADJUSTABLE DOSE PRE-FILLED PEN SYRINGE | Refills: 7 | OUTPATIENT
Start: 2024-09-30

## 2024-10-18 DIAGNOSIS — G43.109 MIGRAINE WITH AURA AND WITHOUT STATUS MIGRAINOSUS, NOT INTRACTABLE: ICD-10-CM

## 2024-10-18 RX ORDER — ELETRIPTAN HYDROBROMIDE 40 MG/1
TABLET, FILM COATED ORAL
Qty: 9 TABLET | Refills: 3 | Status: SHIPPED | OUTPATIENT
Start: 2024-10-18

## 2024-10-18 NOTE — TELEPHONE ENCOUNTER
Please approve or deny     Last Visit Date:  7/19/2024     Dr. Drew    Next Visit Date:    1/17/2025   Dr. Drew

## 2024-10-22 DIAGNOSIS — G43.109 MIGRAINE WITH AURA AND WITHOUT STATUS MIGRAINOSUS, NOT INTRACTABLE: ICD-10-CM

## 2024-10-23 RX ORDER — ERENUMAB-AOOE 140 MG/ML
140 INJECTION, SOLUTION SUBCUTANEOUS
Qty: 1 ADJUSTABLE DOSE PRE-FILLED PEN SYRINGE | Refills: 7 | OUTPATIENT
Start: 2024-10-23

## 2024-11-12 DIAGNOSIS — G43.109 MIGRAINE WITH AURA AND WITHOUT STATUS MIGRAINOSUS, NOT INTRACTABLE: ICD-10-CM

## 2024-11-13 RX ORDER — ERENUMAB-AOOE 140 MG/ML
140 INJECTION, SOLUTION SUBCUTANEOUS
Qty: 1 ADJUSTABLE DOSE PRE-FILLED PEN SYRINGE | Refills: 7 | OUTPATIENT
Start: 2024-11-13

## 2024-11-23 DIAGNOSIS — G43.109 MIGRAINE WITH AURA AND WITHOUT STATUS MIGRAINOSUS, NOT INTRACTABLE: ICD-10-CM

## 2024-11-25 RX ORDER — ERENUMAB-AOOE 140 MG/ML
INJECTION, SOLUTION SUBCUTANEOUS
Qty: 1 ML | OUTPATIENT
Start: 2024-11-25

## 2024-12-19 DIAGNOSIS — G43.109 MIGRAINE WITH AURA AND WITHOUT STATUS MIGRAINOSUS, NOT INTRACTABLE: ICD-10-CM

## 2024-12-20 RX ORDER — ERENUMAB-AOOE 140 MG/ML
140 INJECTION, SOLUTION SUBCUTANEOUS
Qty: 1 ADJUSTABLE DOSE PRE-FILLED PEN SYRINGE | Refills: 7 | Status: SHIPPED | OUTPATIENT
Start: 2024-12-20

## 2024-12-20 NOTE — TELEPHONE ENCOUNTER
Darby Dhillon called requesting a refill on the following medications:  Requested Prescriptions     Pending Prescriptions Disp Refills    Erenumab-aooe (AIMOVIG) 140 MG/ML SOAJ 1 Adjustable Dose Pre-filled Pen Syringe 7     Sig: Inject 140 mg into the skin every 30 days       Date of last visit: 7/19/2024  Date of next visit (if applicable):1/17/2025  Date of last refill: 7/19/24  Pharmacy Name: [facility] L[facility]

## 2025-01-02 ENCOUNTER — TELEPHONE (OUTPATIENT)
Dept: NEUROLOGY | Age: 50
End: 2025-01-02

## 2025-01-02 NOTE — TELEPHONE ENCOUNTER
----- Message from Dr. Giovani Drew MD sent at 12/31/2024 12:05 PM EST -----  Please let patient know the lab results are normal.  Giovani Drew MD

## 2025-01-02 NOTE — TELEPHONE ENCOUNTER
Patient informed of normal lab results and verbalized understanding. No further questions at this time.

## 2025-01-07 DIAGNOSIS — G43.109 MIGRAINE WITH AURA AND WITHOUT STATUS MIGRAINOSUS, NOT INTRACTABLE: ICD-10-CM

## 2025-01-07 RX ORDER — LAMOTRIGINE 150 MG/1
150 TABLET ORAL 2 TIMES DAILY
Qty: 60 TABLET | Refills: 5 | Status: SHIPPED | OUTPATIENT
Start: 2025-01-07

## 2025-01-07 NOTE — TELEPHONE ENCOUNTER
Darby Dhillon called requesting a refill on the following medications:  Requested Prescriptions     Pending Prescriptions Disp Refills    lamoTRIgine (LAMICTAL) 150 MG tablet [Pharmacy Med Name: LAMOTRIGINE 150 MG TABLET] 60 tablet 5     Sig: TAKE 1 TABLET BY MOUTH 2 TIMES A DAY       Date of last visit: 7/19/2024  Date of next visit (if applicable):1/17/25  Date of last refill: 7/15/2024  Pharmacy Name: [facility] L[facility]

## 2025-01-13 DIAGNOSIS — G25.2 ACTION TREMOR: ICD-10-CM

## 2025-01-13 DIAGNOSIS — G43.109 MIGRAINE WITH AURA AND WITHOUT STATUS MIGRAINOSUS, NOT INTRACTABLE: ICD-10-CM

## 2025-01-14 RX ORDER — GABAPENTIN 100 MG/1
200 CAPSULE ORAL 2 TIMES DAILY
Qty: 120 CAPSULE | Refills: 8 | Status: SHIPPED | OUTPATIENT
Start: 2025-01-14 | End: 2025-07-13

## 2025-01-14 NOTE — TELEPHONE ENCOUNTER
Darby Dhillon called requesting a refill on the following medications:  Requested Prescriptions     Pending Prescriptions Disp Refills    gabapentin (NEURONTIN) 100 MG capsule [Pharmacy Med Name: GABAPENTIN 100 MG CAPSULE] 120 capsule 8     Sig: Take 2 capsules by mouth 2 times daily.       Date of last visit: 7/19/2024  Date of next visit (if applicable):1/17/25  Date of last refill: 1/26/24  Pharmacy Name: [facility] LPN

## 2025-01-17 ENCOUNTER — OFFICE VISIT (OUTPATIENT)
Dept: NEUROLOGY | Age: 50
End: 2025-01-17
Payer: COMMERCIAL

## 2025-01-17 VITALS
BODY MASS INDEX: 24.74 KG/M2 | HEART RATE: 68 BPM | SYSTOLIC BLOOD PRESSURE: 106 MMHG | DIASTOLIC BLOOD PRESSURE: 68 MMHG | WEIGHT: 126 LBS | OXYGEN SATURATION: 100 % | HEIGHT: 60 IN

## 2025-01-17 DIAGNOSIS — G43.109 MIGRAINE WITH AURA AND WITHOUT STATUS MIGRAINOSUS, NOT INTRACTABLE: Primary | ICD-10-CM

## 2025-01-17 DIAGNOSIS — G25.2 ACTION TREMOR: ICD-10-CM

## 2025-01-17 PROCEDURE — 1036F TOBACCO NON-USER: CPT | Performed by: PSYCHIATRY & NEUROLOGY

## 2025-01-17 PROCEDURE — G8427 DOCREV CUR MEDS BY ELIG CLIN: HCPCS | Performed by: PSYCHIATRY & NEUROLOGY

## 2025-01-17 PROCEDURE — 3017F COLORECTAL CA SCREEN DOC REV: CPT | Performed by: PSYCHIATRY & NEUROLOGY

## 2025-01-17 PROCEDURE — 99213 OFFICE O/P EST LOW 20 MIN: CPT | Performed by: PSYCHIATRY & NEUROLOGY

## 2025-01-17 PROCEDURE — G8420 CALC BMI NORM PARAMETERS: HCPCS | Performed by: PSYCHIATRY & NEUROLOGY

## 2025-01-17 NOTE — PATIENT INSTRUCTIONS
Continue with  Aimovig 140 mg monthly injection.   Continue  with Neurontin 200 mg twice a day.   Continue with Lamictal 150 mg twice a day. Refills given.  May use Relpax 40 mg daily as needed for for breakthrough headache.   Follow up in 6 months

## 2025-01-17 NOTE — PROGRESS NOTES
NEUROLOGY OUT PATIENT FOLLOW UP NOTE:  1/17/20259:23 AM    Darby Dhillon is here for follow up for headache, tremor.         Allergies   Allergen Reactions    Sulfa Antibiotics Rash       Current Outpatient Medications:     gabapentin (NEURONTIN) 100 MG capsule, Take 2 capsules by mouth 2 times daily for 180 days., Disp: 120 capsule, Rfl: 8    lamoTRIgine (LAMICTAL) 150 MG tablet, TAKE 1 TABLET BY MOUTH 2 TIMES A DAY, Disp: 60 tablet, Rfl: 5    Erenumab-aooe (AIMOVIG) 140 MG/ML SOAJ, Inject 140 mg into the skin every 30 days, Disp: 1 Adjustable Dose Pre-filled Pen Syringe, Rfl: 7    eletriptan (RELPAX) 40 MG tablet, TAKE 1 TABLET BY MOUTH AT ONSET OF MIGRAINE AS NEEDED; MAY REPEAT 1 TABLET IN 2 HOURS IF NEEDED., Disp: 9 tablet, Rfl: 3    L-Glutamine 500 MG CAPS, Take by mouth, Disp: , Rfl:     Potassium 99 MG TABS, Take by mouth, Disp: , Rfl:     Pilocarpine HCl (VUITY) 1.25 % SOLN, Apply to eye, Disp: , Rfl:     cycloSPORINE, PF, (CEQUA) 0.09 % SOLN, Apply to eye, Disp: , Rfl:     albuterol sulfate  (90 Base) MCG/ACT inhaler, as needed, Disp: , Rfl:     omeprazole (PRILOSEC) 20 MG delayed release capsule, 1 capsule as needed, Disp: , Rfl:     acetaminophen (TYLENOL) 325 MG tablet, Take 2 tablets by mouth every 6 hours as needed for Pain, Disp: , Rfl:     Cholecalciferol (VITAMIN D-3) 1000 units CAPS, Take by mouth as needed, Disp: , Rfl:     NONFORMULARY, , Disp: , Rfl:     MULTIPLE VITAMINS PO, Take by mouth daily, Disp: , Rfl:     I reviewed the past medical history, allergies, medications, social history and family history.       PE:   Vitals:    01/17/25 0914   BP: 106/68   Site: Left Upper Arm   Position: Sitting   Cuff Size: Medium Adult   Pulse: 68   SpO2: 100%   Weight: 57.2 kg (126 lb)   Height: 1.524 m (5')        General Appearance:  awake, alert, oriented, in no distress  Gen: NAD, Language is Intact. Skin: no rash, lesion, dry  to touch. warm  Head: no rash, no icterus  Neck:  The Neck is

## 2025-03-24 ENCOUNTER — OFFICE VISIT (OUTPATIENT)
Dept: NEUROLOGY | Age: 50
End: 2025-03-24
Payer: COMMERCIAL

## 2025-03-24 VITALS
SYSTOLIC BLOOD PRESSURE: 105 MMHG | OXYGEN SATURATION: 99 % | DIASTOLIC BLOOD PRESSURE: 70 MMHG | HEIGHT: 60 IN | BODY MASS INDEX: 25.13 KG/M2 | HEART RATE: 64 BPM | WEIGHT: 128 LBS

## 2025-03-24 DIAGNOSIS — G43.109 MIGRAINE WITH AURA AND WITHOUT STATUS MIGRAINOSUS, NOT INTRACTABLE: ICD-10-CM

## 2025-03-24 DIAGNOSIS — G25.2 ACTION TREMOR: Primary | ICD-10-CM

## 2025-03-24 PROCEDURE — G8419 CALC BMI OUT NRM PARAM NOF/U: HCPCS | Performed by: PSYCHIATRY & NEUROLOGY

## 2025-03-24 PROCEDURE — 1036F TOBACCO NON-USER: CPT | Performed by: PSYCHIATRY & NEUROLOGY

## 2025-03-24 PROCEDURE — G8427 DOCREV CUR MEDS BY ELIG CLIN: HCPCS | Performed by: PSYCHIATRY & NEUROLOGY

## 2025-03-24 PROCEDURE — 3017F COLORECTAL CA SCREEN DOC REV: CPT | Performed by: PSYCHIATRY & NEUROLOGY

## 2025-03-24 PROCEDURE — 99214 OFFICE O/P EST MOD 30 MIN: CPT | Performed by: PSYCHIATRY & NEUROLOGY

## 2025-03-24 NOTE — PATIENT INSTRUCTIONS
MRI brain without contrast.  EEG  B12, Folate, B6.   Continue with  Aimovig 140 mg monthly injection.   Continue  with Neurontin 200 mg twice a day.   Continue with Lamictal 150 mg twice a day. Refills given.  May use Relpax 40 mg daily as needed for for breakthrough headache.   Follow up in 3 weeks

## 2025-03-24 NOTE — PROGRESS NOTES
The Neck is supple. Neuro: CN 2-12 grossly intact with no focal deficits. Power 5/5 Throughout symmetric, Reflexes are +2 symmetric. Long tracts are intact. Cerebellar exam is Intact. Sensory exam is intact to light touch.  Gait is intact. There is very mild symmetric action tremor, no head tremor, no voice tremor. There is no resting tremor, no bradykinesia, normal gait.   Musculoskeletal:  Has no hand arthritis, no limitation of ROM in any of the four extremities.  Lower extremities no edema        DATA:         Results for orders placed or performed in visit on 07/06/24   Lamotrigine Level   Result Value Ref Range    Lamotrigine Lvl 7.8 2.5 - 15.0 mcg/mL           Results for orders placed during the hospital encounter of 02/28/17    MRI Brain WO Contrast    Narrative  PROCEDURE: MRI BRAIN WO CONTRAST  CLINICAL INFORMATION Migraine without aura and without status migrainosus, not intractable.  Migraines for years.  COMPARISON: No prior study.  TECHNIQUE: Multiplanar and multiple spin echo MRI images were obtained of the brain without contrast.  FINDINGS:  The diffusion-weighted images are normal.  The brain volume is normal. There is a minimal amount of abnormal signal in the white matter of the brain. These are punctate and faint. One is seen on axial image 16 of the FLAIR sequence. Another is seen on  axial image 15. These are in the subcortical white matter of the left frontal lobe.  There are no intra-or extra-axial collections.  There is no hydrocephalus, midline shift or mass effect.  There is no susceptibility artifact in the brain.      The major intracranial vascular flow voids are present.  The midline craniocervical junction structures are normal.  The pituitary gland and brainstem are normal.  Impression  1. Normal brain volume.  2. Minimal amount of abnormal signal which is faint in the subcortical white matter of the left frontal lobe. This can be related to patient history of chronic headaches.

## 2025-04-04 ENCOUNTER — HOSPITAL ENCOUNTER (OUTPATIENT)
Dept: NEUROLOGY | Age: 50
Discharge: HOME | End: 2025-04-04
Attending: PSYCHIATRY & NEUROLOGY
Payer: COMMERCIAL

## 2025-04-04 DIAGNOSIS — G25.2 ACTION TREMOR: ICD-10-CM

## 2025-04-04 PROCEDURE — 95819 EEG AWAKE AND ASLEEP: CPT

## 2025-04-04 NOTE — PROGRESS NOTES
[facility]  Neurodiagnostic Laboratory Technician Report  STRZ EEG  Routine, Standard Test    Name: Darby Dhillon  : 1975  Age: 49 y.o.  Sex: female  MRN: [id number]  CSN: [id number]    Ordering Provider: Giovani Drew MD       EEG Number: [id number]     Time In: Time In: 1014 (25)  Time Out: Time Out: 1037 (2025)  Total Treatment Time: Minutes: 23          Clinical History: History of migraines.  She is here for an action tremor in her arms and twitching/tingling while sleeping.    Past Medical History:       Diagnosis Date    Allergic rhinitis     Bladder infection     Blood in urine     Bronchitis     Frequent headaches     Frequent sinus infections     GERD (gastroesophageal reflux disease)     Headache     Irritable bowel syndrome     Migraines     Painful swelling of joint     Rosacea     Tattoos        Medications:   Prior to Admission medications    Medication Sig Start Date End Date Taking? Authorizing Provider   gabapentin (NEURONTIN) 100 MG capsule Take 2 capsules by mouth 2 times daily for 180 days. 25  Giovani Drew MD   lamoTRIgine (LAMICTAL) 150 MG tablet TAKE 1 TABLET BY MOUTH 2 TIMES A DAY 25   Giovani Drew MD   Erenumab-aooe (AIMOVIG) 140 MG/ML SOAJ Inject 140 mg into the skin every 30 days 24   Giovani Drew MD   eletriptan (RELPAX) 40 MG tablet TAKE 1 TABLET BY MOUTH AT ONSET OF MIGRAINE AS NEEDED; MAY REPEAT 1 TABLET IN 2 HOURS IF NEEDED. 10/18/24   Leopold, Paige L, APRN - CNP   L-Glutamine 500 MG CAPS Take by mouth    ProviderDar MD   Potassium 99 MG TABS Take by mouth    ProviderDar MD   Pilocarpine HCl (VUITY) 1.25 % SOLN Apply to eye    ProviderDar MD   cycloSPORINE, PF, (CEQUA) 0.09 % SOLN Apply to eye    ProviderDar MD   albuterol sulfate  (90 Base) MCG/ACT inhaler as needed 20   Dar Sterling MD   omeprazole (PRILOSEC) 20 MG delayed release capsule 1 capsule

## 2025-04-11 ENCOUNTER — RESULTS FOLLOW-UP (OUTPATIENT)
Dept: NEUROLOGY | Age: 50
End: 2025-04-11

## 2025-04-12 NOTE — RESULT ENCOUNTER NOTE
Please ask patient to start B6 supplements daily over the counter (preferably 50 mg daily), not multivitamins. The level is low = 3.6. Repeat level in two months.

## 2025-04-14 ENCOUNTER — HOSPITAL ENCOUNTER (OUTPATIENT)
Dept: MRI IMAGING | Age: 50
Discharge: HOME | End: 2025-04-14
Attending: PSYCHIATRY & NEUROLOGY
Payer: COMMERCIAL

## 2025-04-14 DIAGNOSIS — G43.109 MIGRAINE WITH AURA AND WITHOUT STATUS MIGRAINOSUS, NOT INTRACTABLE: ICD-10-CM

## 2025-04-14 DIAGNOSIS — G25.2 ACTION TREMOR: ICD-10-CM

## 2025-04-14 PROCEDURE — 70551 MRI BRAIN STEM W/O DYE: CPT

## 2025-04-15 ENCOUNTER — RESULTS FOLLOW-UP (OUTPATIENT)
Dept: NEUROLOGY | Age: 50
End: 2025-04-15

## 2025-04-15 NOTE — RESULT ENCOUNTER NOTE
Please let patient know MRI Brain is stable compared to prior study in 2017.  Giovani Drew MD 10:51 AM

## 2025-04-28 ENCOUNTER — OFFICE VISIT (OUTPATIENT)
Dept: NEUROLOGY | Age: 50
End: 2025-04-28
Payer: COMMERCIAL

## 2025-04-28 VITALS
SYSTOLIC BLOOD PRESSURE: 122 MMHG | OXYGEN SATURATION: 100 % | BODY MASS INDEX: 26.31 KG/M2 | WEIGHT: 134 LBS | HEIGHT: 60 IN | DIASTOLIC BLOOD PRESSURE: 68 MMHG | HEART RATE: 67 BPM

## 2025-04-28 DIAGNOSIS — G25.2 ACTION TREMOR: ICD-10-CM

## 2025-04-28 DIAGNOSIS — G43.109 MIGRAINE WITH AURA AND WITHOUT STATUS MIGRAINOSUS, NOT INTRACTABLE: Primary | ICD-10-CM

## 2025-04-28 PROCEDURE — 99214 OFFICE O/P EST MOD 30 MIN: CPT | Performed by: PSYCHIATRY & NEUROLOGY

## 2025-04-28 PROCEDURE — G8427 DOCREV CUR MEDS BY ELIG CLIN: HCPCS | Performed by: PSYCHIATRY & NEUROLOGY

## 2025-04-28 PROCEDURE — 3017F COLORECTAL CA SCREEN DOC REV: CPT | Performed by: PSYCHIATRY & NEUROLOGY

## 2025-04-28 PROCEDURE — G8419 CALC BMI OUT NRM PARAM NOF/U: HCPCS | Performed by: PSYCHIATRY & NEUROLOGY

## 2025-04-28 PROCEDURE — 1036F TOBACCO NON-USER: CPT | Performed by: PSYCHIATRY & NEUROLOGY

## 2025-04-28 NOTE — PATIENT INSTRUCTIONS
Take Vitamin B6 50 mg daily, supplements over the counter.   Continue with  Aimovig 140 mg monthly injection.   Continue  with Neurontin 200 mg twice a day.   Continue with Lamictal 150 mg twice a day. Refills given.  May use Relpax 40 mg daily as needed for for breakthrough headache.   Follow up in 7/2025

## 2025-04-28 NOTE — PROGRESS NOTES
NEUROLOGY OUT PATIENT FOLLOW UP NOTE:  4/28/20258:13 AM    Darby Dhillon is here for follow up for headache, tremor.         Allergies   Allergen Reactions    Sulfa Antibiotics Rash       Current Outpatient Medications:     gabapentin (NEURONTIN) 100 MG capsule, Take 2 capsules by mouth 2 times daily for 180 days., Disp: 120 capsule, Rfl: 8    lamoTRIgine (LAMICTAL) 150 MG tablet, TAKE 1 TABLET BY MOUTH 2 TIMES A DAY, Disp: 60 tablet, Rfl: 5    Erenumab-aooe (AIMOVIG) 140 MG/ML SOAJ, Inject 140 mg into the skin every 30 days, Disp: 1 Adjustable Dose Pre-filled Pen Syringe, Rfl: 7    eletriptan (RELPAX) 40 MG tablet, TAKE 1 TABLET BY MOUTH AT ONSET OF MIGRAINE AS NEEDED; MAY REPEAT 1 TABLET IN 2 HOURS IF NEEDED., Disp: 9 tablet, Rfl: 3    L-Glutamine 500 MG CAPS, Take by mouth, Disp: , Rfl:     Potassium 99 MG TABS, Take by mouth, Disp: , Rfl:     Pilocarpine HCl (VUITY) 1.25 % SOLN, Apply to eye, Disp: , Rfl:     cycloSPORINE, PF, (CEQUA) 0.09 % SOLN, Apply to eye, Disp: , Rfl:     albuterol sulfate  (90 Base) MCG/ACT inhaler, as needed, Disp: , Rfl:     omeprazole (PRILOSEC) 20 MG delayed release capsule, 1 capsule as needed, Disp: , Rfl:     acetaminophen (TYLENOL) 325 MG tablet, Take 2 tablets by mouth every 6 hours as needed for Pain, Disp: , Rfl:     Cholecalciferol (VITAMIN D-3) 1000 units CAPS, Take by mouth as needed, Disp: , Rfl:     NONFORMULARY, , Disp: , Rfl:     MULTIPLE VITAMINS PO, Take by mouth daily, Disp: , Rfl:     I reviewed the past medical history, allergies, medications, social history and family history.       PE:   Vitals:    04/28/25 0804   BP: 122/68   BP Site: Right Upper Arm   Patient Position: Sitting   BP Cuff Size: Medium Adult   Pulse: 67   SpO2: 100%   Weight: 60.8 kg (134 lb)   Height: 1.524 m (5')        General Appearance:  awake, alert, oriented, in no distress  Gen: NAD, Language is Intact. Skin: no rash, lesion, dry  to touch. warm  Head: no rash, no icterus  Neck:

## 2025-05-12 DIAGNOSIS — G43.109 MIGRAINE WITH AURA AND WITHOUT STATUS MIGRAINOSUS, NOT INTRACTABLE: ICD-10-CM

## 2025-05-13 RX ORDER — LAMOTRIGINE 150 MG/1
150 TABLET ORAL 2 TIMES DAILY
Qty: 60 TABLET | Refills: 5 | Status: SHIPPED | OUTPATIENT
Start: 2025-05-13

## 2025-05-13 NOTE — TELEPHONE ENCOUNTER
Please approve or deny     Last Visit Date:  4/28/2025       Next Visit Date:    7/18/2025 Paige Leopold CNP       Lamotrigine Level 7/6/2024 = 7.8

## 2025-06-02 DIAGNOSIS — G43.109 MIGRAINE WITH AURA AND WITHOUT STATUS MIGRAINOSUS, NOT INTRACTABLE: ICD-10-CM

## 2025-06-02 RX ORDER — LAMOTRIGINE 150 MG/1
150 TABLET ORAL 2 TIMES DAILY
Qty: 60 TABLET | Refills: 5 | Status: CANCELLED | OUTPATIENT
Start: 2025-06-02

## 2025-06-03 RX ORDER — ERENUMAB-AOOE 140 MG/ML
140 INJECTION, SOLUTION SUBCUTANEOUS
Qty: 1 ADJUSTABLE DOSE PRE-FILLED PEN SYRINGE | Refills: 7 | Status: SHIPPED | OUTPATIENT
Start: 2025-06-03

## 2025-06-03 NOTE — TELEPHONE ENCOUNTER
We have received a refill request on the following medications:  Requested Prescriptions     Pending Prescriptions Disp Refills    Erenumab-aooe (AIMOVIG) 140 MG/ML SOAJ 1 Adjustable Dose Pre-filled Pen Syringe 7     Sig: Inject 140 mg into the skin every 30 days       Date last time medication prescribed: 12/20/2024    Last Visit Date:  4/28/2025 with Giovani Drew MD    Next Visit Date:    7/18/2025 with Paige Leopold, CNP    Please approve or deny.

## 2025-06-30 DIAGNOSIS — R25.1 TREMOR: Primary | ICD-10-CM

## 2025-06-30 DIAGNOSIS — G43.109 MIGRAINE WITH AURA AND WITHOUT STATUS MIGRAINOSUS, NOT INTRACTABLE: ICD-10-CM

## 2025-06-30 RX ORDER — GABAPENTIN 300 MG/1
300 CAPSULE ORAL 3 TIMES DAILY
Qty: 90 CAPSULE | Refills: 3 | Status: SHIPPED | OUTPATIENT
Start: 2025-06-30 | End: 2026-03-27

## 2025-07-08 ENCOUNTER — PATIENT MESSAGE (OUTPATIENT)
Dept: NEUROLOGY | Age: 50
End: 2025-07-08

## 2025-07-08 DIAGNOSIS — G43.109 MIGRAINE WITH AURA AND WITHOUT STATUS MIGRAINOSUS, NOT INTRACTABLE: ICD-10-CM

## 2025-07-09 RX ORDER — LAMOTRIGINE 150 MG/1
150 TABLET ORAL 2 TIMES DAILY
Qty: 60 TABLET | Refills: 5 | Status: SHIPPED | OUTPATIENT
Start: 2025-07-09

## 2025-07-09 NOTE — TELEPHONE ENCOUNTER
Please see [employer] message:   Please refill my lamictal to [facility] in [place]. My last pill is Thursday. They state they have contacted the office and have not had a response.   I'm not sure what happened with the refills. I had requested these and received a message from the office that it has been sent. But [facility] does not have that.      We have received a refill request on the following medications:  Requested Prescriptions     Pending Prescriptions Disp Refills    lamoTRIgine (LAMICTAL) 150 MG tablet 60 tablet 5     Sig: Take 1 tablet by mouth 2 times daily       Date last time medication prescribed: 5/13/25    Most recent labs completed:  Lamotrigine 7/06/24 = 7.8    Last Visit Date:  4/28/2025 with Paige Leopold, CNP    Next Visit Date:    7/18/2025 with Paige Leopold, CNP    Please approve or deny.

## 2025-07-18 ENCOUNTER — OFFICE VISIT (OUTPATIENT)
Dept: NEUROLOGY | Age: 50
End: 2025-07-18
Payer: COMMERCIAL

## 2025-07-18 VITALS
DIASTOLIC BLOOD PRESSURE: 62 MMHG | BODY MASS INDEX: 25.97 KG/M2 | HEIGHT: 60 IN | SYSTOLIC BLOOD PRESSURE: 114 MMHG | HEART RATE: 60 BPM | OXYGEN SATURATION: 99 % | WEIGHT: 132.25 LBS

## 2025-07-18 DIAGNOSIS — G25.2 ACTION TREMOR: ICD-10-CM

## 2025-07-18 DIAGNOSIS — G43.109 MIGRAINE WITH AURA AND WITHOUT STATUS MIGRAINOSUS, NOT INTRACTABLE: Primary | ICD-10-CM

## 2025-07-18 PROCEDURE — 1036F TOBACCO NON-USER: CPT | Performed by: NURSE PRACTITIONER

## 2025-07-18 PROCEDURE — 3017F COLORECTAL CA SCREEN DOC REV: CPT | Performed by: NURSE PRACTITIONER

## 2025-07-18 PROCEDURE — 99213 OFFICE O/P EST LOW 20 MIN: CPT | Performed by: NURSE PRACTITIONER

## 2025-07-18 PROCEDURE — G8427 DOCREV CUR MEDS BY ELIG CLIN: HCPCS | Performed by: NURSE PRACTITIONER

## 2025-07-18 PROCEDURE — G8419 CALC BMI OUT NRM PARAM NOF/U: HCPCS | Performed by: NURSE PRACTITIONER

## 2025-07-18 RX ORDER — ELETRIPTAN HYDROBROMIDE 40 MG/1
TABLET, FILM COATED ORAL
Qty: 9 TABLET | Refills: 3 | Status: SHIPPED | OUTPATIENT
Start: 2025-07-18

## 2025-07-18 RX ORDER — LANOLIN ALCOHOL/MO/W.PET/CERES
50 CREAM (GRAM) TOPICAL DAILY
COMMUNITY

## 2025-07-18 NOTE — PATIENT INSTRUCTIONS
Take Vitamin B6 50 mg daily, supplements over the counter.   Repeat Vitamin B6 level  CBC, CMP  Continue with  Aimovig 140 mg monthly injection.   Continue  with Neurontin 300 mg three a day.   Continue with Lamictal 150 mg twice a day. Refills given.  May use Relpax 40 mg daily as needed for for breakthrough headache.   Keep headache diary  Follow up in in 6 months or sooner if neeed

## 2025-07-18 NOTE — PROGRESS NOTES
oriented, in no distress  Gen: NAD, Language is Intact. Skin: no rash, lesion, dry  to touch. warm  Head: no rash, no icterus  Neck:  The Neck is supple. Neuro: CN 2-12 grossly intact with no focal deficits. Power 5/5 Throughout symmetric, Reflexes are +2 symmetric. Long tracts are intact. Cerebellar exam is Intact. Sensory exam is intact to light touch.  Gait is intact. There is no action tremor, no head tremor, no voice tremor noted on exam today. There is no resting tremor, no bradykinesia, normal gait.   Musculoskeletal:  Has no hand arthritis, no limitation of ROM in any of the four extremities.  Lower extremities no edema        DATA:       Results for orders placed or performed in visit on 07/06/24   Lamotrigine Level   Result Value Ref Range    Lamotrigine Lvl 7.8 2.5 - 15.0 mcg/mL           Results for orders placed during the hospital encounter of 04/14/25    MRI BRAIN WO CONTRAST    Narrative  PROCEDURE: MRI BRAIN WO CONTRAST    CLINICAL INFORMATION Migraine with aura, not intractable, without status  migrainosus; Other specified forms of tremor.    COMPARISON: MRI scan of the brain dated 28 February 2017.    TECHNIQUE: Multiplanar and multiple spin echo MRI images were obtained of the  brain without contrast.    FINDINGS:        The diffusion-weighted images are normal.  The brain volume is normal. There is  a mild amount of signal hyperintensity on the FLAIR and T2-weighted sequences in  the white matter of the brain mainly in the right frontal periventricular white  matter, unchanged since previous study dated 28 February 2017.. This is  consistent with mild severity chronic small vessel ischemic changes.    There are no intra-or extra-axial collections.  There is no hydrocephalus,  midline shift or mass effect.    There are no areas of susceptibility artifact noted. The major intracranial  vascular flow voids are present.    The midline craniocervical junction structures are normal.  The pituitary